# Patient Record
Sex: MALE | Race: WHITE | URBAN - METROPOLITAN AREA
[De-identification: names, ages, dates, MRNs, and addresses within clinical notes are randomized per-mention and may not be internally consistent; named-entity substitution may affect disease eponyms.]

---

## 2018-07-19 ENCOUNTER — TRANSFERRED RECORDS (OUTPATIENT)
Dept: HEALTH INFORMATION MANAGEMENT | Facility: CLINIC | Age: 68
End: 2018-07-19

## 2018-09-04 ENCOUNTER — MEDICAL CORRESPONDENCE (OUTPATIENT)
Dept: HEALTH INFORMATION MANAGEMENT | Facility: CLINIC | Age: 68
End: 2018-09-04

## 2018-09-04 ENCOUNTER — TRANSFERRED RECORDS (OUTPATIENT)
Dept: HEALTH INFORMATION MANAGEMENT | Facility: CLINIC | Age: 68
End: 2018-09-04

## 2018-09-07 ENCOUNTER — TELEPHONE (OUTPATIENT)
Dept: SURGERY | Facility: CLINIC | Age: 68
End: 2018-09-07

## 2018-09-07 NOTE — TELEPHONE ENCOUNTER
Patient is being referred by Dr. Kenyon Zhu for rectal prolapse.  Patient is scheduled 9/17/18 at 2:00 pm.  Spoke with Naomi, care coordinator for Dr. Zhu.  Naomi will contact patient with appointment details, and push images to [place] PACS system.  Naomi has my direct number for additional questions or concerns.

## 2018-09-17 ENCOUNTER — OFFICE VISIT (OUTPATIENT)
Dept: SURGERY | Facility: CLINIC | Age: 68
End: 2018-09-17
Payer: COMMERCIAL

## 2018-09-17 VITALS
HEART RATE: 82 BPM | HEIGHT: 70 IN | OXYGEN SATURATION: 96 % | DIASTOLIC BLOOD PRESSURE: 60 MMHG | TEMPERATURE: 98.4 F | BODY MASS INDEX: 22.45 KG/M2 | SYSTOLIC BLOOD PRESSURE: 101 MMHG | WEIGHT: 156.8 LBS

## 2018-09-17 DIAGNOSIS — K62.89 ANAL PAIN: ICD-10-CM

## 2018-09-17 DIAGNOSIS — K62.6 RECTAL ULCER: ICD-10-CM

## 2018-09-17 DIAGNOSIS — K62.3 RECTAL PROLAPSE: Primary | ICD-10-CM

## 2018-09-17 LAB
ABO + RH BLD: NORMAL
ABO + RH BLD: NORMAL
ALBUMIN SERPL-MCNC: 3.2 G/DL (ref 3.4–5)
ALP SERPL-CCNC: 160 U/L (ref 40–150)
ALT SERPL W P-5'-P-CCNC: 63 U/L (ref 0–70)
ANION GAP SERPL CALCULATED.3IONS-SCNC: 6 MMOL/L (ref 3–14)
AST SERPL W P-5'-P-CCNC: 76 U/L (ref 0–45)
BILIRUB SERPL-MCNC: 0.7 MG/DL (ref 0.2–1.3)
BLD GP AB SCN SERPL QL: NORMAL
BLOOD BANK CMNT PATIENT-IMP: NORMAL
BUN SERPL-MCNC: 14 MG/DL (ref 7–30)
CALCIUM SERPL-MCNC: 9 MG/DL (ref 8.5–10.1)
CEA SERPL-MCNC: 0.8 UG/L (ref 0–2.5)
CHLORIDE SERPL-SCNC: 104 MMOL/L (ref 94–109)
CO2 SERPL-SCNC: 27 MMOL/L (ref 20–32)
CREAT SERPL-MCNC: 0.98 MG/DL (ref 0.66–1.25)
ERYTHROCYTE [DISTWIDTH] IN BLOOD BY AUTOMATED COUNT: 14 % (ref 10–15)
GFR SERPL CREATININE-BSD FRML MDRD: 76 ML/MIN/1.7M2
GLUCOSE SERPL-MCNC: 92 MG/DL (ref 70–99)
HCT VFR BLD AUTO: 46.3 % (ref 40–53)
HGB BLD-MCNC: 14.9 G/DL (ref 13.3–17.7)
MCH RBC QN AUTO: 29.4 PG (ref 26.5–33)
MCHC RBC AUTO-ENTMCNC: 32.2 G/DL (ref 31.5–36.5)
MCV RBC AUTO: 91 FL (ref 78–100)
PLATELET # BLD AUTO: 194 10E9/L (ref 150–450)
POTASSIUM SERPL-SCNC: 3.7 MMOL/L (ref 3.4–5.3)
PREALB SERPL IA-MCNC: 20 MG/DL (ref 15–45)
PROT SERPL-MCNC: 8.3 G/DL (ref 6.8–8.8)
RBC # BLD AUTO: 5.07 10E12/L (ref 4.4–5.9)
SODIUM SERPL-SCNC: 138 MMOL/L (ref 133–144)
SPECIMEN EXP DATE BLD: NORMAL
WBC # BLD AUTO: 6.7 10E9/L (ref 4–11)

## 2018-09-17 RX ORDER — METOPROLOL TARTRATE 25 MG/1
25 TABLET, FILM COATED ORAL EVERY MORNING
COMMUNITY

## 2018-09-17 RX ORDER — ATORVASTATIN CALCIUM 80 MG/1
40 TABLET, FILM COATED ORAL EVERY MORNING
COMMUNITY
Start: 2018-07-03

## 2018-09-17 RX ORDER — ALBUTEROL SULFATE 90 UG/1
1 AEROSOL, METERED RESPIRATORY (INHALATION) EVERY 4 HOURS PRN
COMMUNITY
Start: 2016-07-25

## 2018-09-17 RX ORDER — TAMSULOSIN HYDROCHLORIDE 0.4 MG/1
0.4 CAPSULE ORAL EVERY MORNING
COMMUNITY
Start: 2016-05-04

## 2018-09-17 RX ORDER — FLUCONAZOLE 100 MG/1
100 TABLET ORAL DAILY
COMMUNITY
Start: 2018-09-07

## 2018-09-17 ASSESSMENT — ENCOUNTER SYMPTOMS
SHORTNESS OF BREATH: 1
DYSPNEA ON EXERTION: 1

## 2018-09-17 ASSESSMENT — PAIN SCALES - GENERAL: PAINLEVEL: MILD PAIN (3)

## 2018-09-17 NOTE — PROGRESS NOTES
"Colon and Rectal Surgery Clinic Note    RE: Haseeb Neal.  : 1950.  MIN: 2018.    Reason for visit: Rectal ulcer and prolapse.    HPI: 68 y/o M who recently presented with Dr. Zhu for \"hemorrhoids\" with a 6-month h/o progressive anal pain associated with intermittent seepage of mucous. Denies hematochezia, fecal urgency or incontinence, unintentional weight loss, or urinary symptoms. Currently having 1-2 soft BMs per day. Denies excessive straining or sensation of incomplete evacuation. PM/SH significant for CAD, MI, HTN, COPD, dyslipidemia, AAA (s/p aortobifemoral bypass with re-implantation of JORGE in 2016), and chronic tobacco abuse (1 PPD) quit in 2016. Drinks 2-4 beers per day. Denies FH of CRC or polyps. Last colonoscopy was in  and 3 small tubular adenomas were removed. No previous anorectal operations.      Medical history:  No past medical history on file.    Surgical history:  No past surgical history on file.    Family history:  No family history on file.    Medications:  Current Outpatient Prescriptions   Medication Sig Dispense Refill     albuterol (VENTOLIN HFA) 108 (90 Base) MCG/ACT inhaler Inhale 1 puff into the lungs       tamsulosin (FLOMAX) 0.4 MG capsule Take 0.4 mg by mouth       aspirin 81 MG tablet Take 81 mg by mouth       atorvastatin (LIPITOR) 80 MG tablet        fluconazole (DIFLUCAN) 100 MG tablet        fluticasone-vilanterol (BREO ELLIPTA) 100-25 MCG/INH inhaler        INCRUSE ELLIPTA 62.5 MCG/INH Inhaler        metoprolol tartrate (LOPRESSOR) 25 MG tablet Take 25 mg by mouth         Allergies:  Allergies   Allergen Reactions     Penicillins Hives       Social history:   Social History   Substance Use Topics     Smoking status: Former Smoker     Smokeless tobacco: Never Used     Alcohol use Yes     Marital status: .    Physical Examination:  /60  Pulse 82  Temp 98.4  F (36.9  C) (Oral)  Ht 5' 10\"  Wt 156 lb 12.8 oz  SpO2 96%  BMI 22.5 " kg/m2  General: Well hydrated. No acute distress.  Abdomen: Soft, NT. No inguinal adenopathy palpated.  Perianal external examination:  Perianal skin: circumferential lichenification and superficial fissuring. Moist anus.  Lesions: Yes: 2x2-cm area of induration at the right anterior aspect of the anal verge. No discrete mass per se. Tender upon superficial palpation. No fluctuance or purulence.  Eversion of buttocks: There was not evidence of an anal fissure. Details: N/A.  Skin tags or external hemorrhoids: Yes: small, non-thrombosed.  Digital rectal examination: Could not be completed to do patient discomfort.    Investigations:  None.    Procedures:  None.    ASSESSMENT  68 y/o M with significant PMH and progressive anal pain. Limited exam findings suspicious for anorectal infection vs. neoplasm. Risks, benefits, and alternatives of operative treatment were thoroughly discussed with the patient, he/she understands these well and agrees to proceed.    PLAN  1. Schedule Labs and 3T MR pelvis today.  2. Schedule EUA, Flex Sig, possible biopsies. Will need PAC and 2 Fleets.    Time spent: 60 minutes.  >50% spent in discussing, counseling and coordinating care.    Radu Asher M.D., M.Sc.     Division of Colon and Rectal Surgery  [facility]    Referring Provider:  Kenyon Zhu DO  [facility]  [address] [place] [address]     Primary Care Provider:  Jimy Hill

## 2018-09-17 NOTE — LETTER
"2018       RE: Haseeb Neal  [address]  [facility] [address]     Dear Colleague,    Thank you for referring your patient, Haseeb Neal, to the [facility] COLON AND RECTAL SURGERY at [facility]. Please see a copy of my visit note below.    Colon and Rectal Surgery Clinic Note    RE: Haseeb Neal.  : 1950.  MIN: 2018.    Reason for visit: Rectal ulcer and prolapse.    HPI: 66 y/o M who recently presented with Dr. Zhu for \"hemorrhoids\" with a 6-month h/o progressive anal pain associated with intermittent seepage of mucous. Denies hematochezia, fecal urgency or incontinence, unintentional weight loss, or urinary symptoms. Currently having 1-2 soft BMs per day. Denies excessive straining or sensation of incomplete evacuation. PM/SH significant for CAD, MI, HTN, COPD, dyslipidemia, AAA (s/p aortobifemoral bypass with re-implantation of JORGE in ), and chronic tobacco abuse (1 PPD) quit in 2016. Drinks 2-4 beers per day. Denies FH of CRC or polyps. Last colonoscopy was in  and 3 small tubular adenomas were removed. No previous anorectal operations.      Medical history:  No past medical history on file.    Surgical history:  No past surgical history on file.    Family history:  No family history on file.    Medications:  Current Outpatient Prescriptions   Medication Sig Dispense Refill     albuterol (VENTOLIN HFA) 108 (90 Base) MCG/ACT inhaler Inhale 1 puff into the lungs       tamsulosin (FLOMAX) 0.4 MG capsule Take 0.4 mg by mouth       aspirin 81 MG tablet Take 81 mg by mouth       atorvastatin (LIPITOR) 80 MG tablet        fluconazole (DIFLUCAN) 100 MG tablet        fluticasone-vilanterol (BREO ELLIPTA) 100-25 MCG/INH inhaler        INCRUSE ELLIPTA 62.5 MCG/INH Inhaler        metoprolol tartrate (LOPRESSOR) 25 MG tablet Take 25 mg by mouth         Allergies:  Allergies   Allergen Reactions     Penicillins Hives       Social history: " "  Social History   Substance Use Topics     Smoking status: Former Smoker     Smokeless tobacco: Never Used     Alcohol use Yes     Marital status: .    Physical Examination:  /60  Pulse 82  Temp 98.4  F (36.9  C) (Oral)  Ht 5' 10\"  Wt 156 lb 12.8 oz  SpO2 96%  BMI 22.5 kg/m2  General: Well hydrated. No acute distress.  Abdomen: Soft, NT. No inguinal adenopathy palpated.  Perianal external examination:  Perianal skin: circumferential lichenification and superficial fissuring. Moist anus.  Lesions: Yes: 2x2-cm area of induration at the right anterior aspect of the anal verge. No discrete mass per se. Tender upon superficial palpation. No fluctuance or purulence.  Eversion of buttocks: There was not evidence of an anal fissure. Details: N/A.  Skin tags or external hemorrhoids: Yes: small, non-thrombosed.  Digital rectal examination: Could not be completed to do patient discomfort.    Investigations:  None.    Procedures:  None.    ASSESSMENT  66 y/o M with significant PMH and progressive anal pain. Limited exam findings suspicious for anorectal infection vs. neoplasm. Risks, benefits, and alternatives of operative treatment were thoroughly discussed with the patient, he/she understands these well and agrees to proceed.    PLAN  1. Schedule Labs and 3T MR pelvis today.  2. Schedule EUA, Flex Sig, possible biopsies. Will need PAC and 2 Fleets.    Time spent: 60 minutes.  >50% spent in discussing, counseling and coordinating care.    Radu Asher M.D., M.Sc.     Division of Colon and Rectal Surgery  [facility]    Referring Provider:  Kenyon Zhu [facility]  [address] [place] [address]   [place] [address]     Primary Care Provider:  Jimy Hill      Again, thank you for allowing me to participate in the care of your patient.      Sincerely,    Rdau Asher MD      "

## 2018-09-17 NOTE — NURSING NOTE
"Chief Complaint   Patient presents with     Clinic Care Coordination - Follow-up     New patient consultation, rectal prolapse.        Vitals:    09/17/18 1429   BP: 101/60   Pulse: 82   Temp: 98.4  F (36.9  C)   TempSrc: Oral   SpO2: 96%   Weight: 156 lb 12.8 oz   Height: 5' 10\"       Body mass index is 22.5 kg/(m^2).            Abner LEE LPN               "

## 2018-09-17 NOTE — MR AVS SNAPSHOT
After Visit Summary   2018    Haseeb Neal    MRN: [id number]           Patient Information     Date Of Birth          1950        Visit Information        Provider Department      2018 2:00 PM Radu Asher MD [facility] Colon and Rectal Surgery        Today's Diagnoses     Rectal prolapse    -  1    Rectal ulcer        Anal pain           Follow-ups after your visit        Additional Services     PAC Visit Referral (For [facility] Only)                 Future tests that were ordered for you today     Open Future Orders        Priority Expected Expires Ordered    MR Pelvis Musclular Tissue wo & w Contrast Routine  2019    CBC with platelets Routine  12/15/2018 2018    Comprehensive metabolic panel Routine  12/15/2018 2018    CEA Routine  12/15/2018 2018    Prealbumin Routine  12/15/2018 2018            Who to contact     Please call your clinic at [phone] to:    Ask questions about your health    Make or cancel appointments    Discuss your medicines    Learn about your test results    Speak to your doctor            Additional Information About Your Visit        MyChart Information     Holvi is an electronic gateway that provides easy, online access to your medical records. With Holvi, you can request a clinic appointment, read your test results, renew a prescription or communicate with your care team.     To sign up for Holvi visit the website at [email or web address]   You will be asked to enter the access code listed below, as well as some personal information. Please follow the directions to create your username and password.     Your access code is: [email or web address]  Expires: 2018  6:30 AM     Your access code will  in 90 days. If you need help or a new code, please contact your [facility] or call [phone] for assistance.        Care EveryWhere ID     This is your Care  "EveryWhere ID. This could be used by other organizations to access your [place] medical records  [id number]        Your Vitals Were     Pulse Temperature Height Pulse Oximetry BMI (Body Mass Index)       82 98.4  F (36.9  C) (Oral) 5' 10\" 96% 22.5 kg/m2        Blood Pressure from Last 3 Encounters:   09/17/18 101/60    Weight from Last 3 Encounters:   09/17/18 156 lb 12.8 oz              We Performed the Following     ABO/Rh type and screen     PAC Visit Referral (For [facility] Only)     Hattie-Operative Worksheet        Primary Care Provider Office Phone # Fax #    Jimy Hill [phone] [address] [facility] [address]        Equal Access to Services     MAKENZIE GAMA : Hadii javier rosado hadasho Soomaali, waaxda luqadaha, qaybta kaalmada adeegyada, waxdilcia chahalin hayjennifer holland . So [facility] [phone].    ATENCIÓN: Si habla español, tiene a rogers disposición servicios gratuitos de asistencia lingüística. [facility] [phone].    We comply with applicable federal civil rights laws and [place] laws. We do not discriminate on the basis of race, color, national origin, age, disability, sex, sexual orientation, or gender identity.            Thank you!     Thank you for choosing [facility] COLON AND RECTAL SURGERY  for your care. Our goal is always to provide you with excellent care. Hearing back from our patients is one way we can continue to improve our services. Please take a few minutes to complete the written survey that you may receive in the mail after your visit with us. Thank you!             Your Updated Medication List - Protect others around you: Learn how to safely use, store and throw away your medicines at www.disposemymeds.org.          This list is accurate as of 9/17/18  3:08 PM.  Always use your most recent med list.                   Brand Name Dispense Instructions for use Diagnosis    aspirin 81 MG tablet      Take 81 mg by mouth        atorvastatin 80 MG " tablet    LIPITOR          fluconazole 100 MG tablet    DIFLUCAN          fluticasone-vilanterol 100-25 MCG/INH inhaler    BREO ELLIPTA          INCRUSE ELLIPTA 62.5 MCG/INH inhaler   Generic drug:  umeclidinium           metoprolol tartrate 25 MG tablet    LOPRESSOR     Take 25 mg by mouth        tamsulosin 0.4 MG capsule    FLOMAX     Take 0.4 mg by mouth        VENTOLIN  (90 Base) MCG/ACT inhaler   Generic drug:  albuterol      Inhale 1 puff into the lungs

## 2018-09-28 ENCOUNTER — OFFICE VISIT (OUTPATIENT)
Dept: SURGERY | Facility: CLINIC | Age: 68
End: 2018-09-28
Payer: COMMERCIAL

## 2018-09-28 ENCOUNTER — ANESTHESIA EVENT (OUTPATIENT)
Dept: SURGERY | Facility: CLINIC | Age: 68
End: 2018-09-28
Payer: MEDICARE

## 2018-09-28 ENCOUNTER — RADIANT APPOINTMENT (OUTPATIENT)
Dept: MRI IMAGING | Facility: CLINIC | Age: 68
End: 2018-09-28
Attending: COLON & RECTAL SURGERY
Payer: COMMERCIAL

## 2018-09-28 VITALS
HEART RATE: 72 BPM | OXYGEN SATURATION: 98 % | BODY MASS INDEX: 22.58 KG/M2 | SYSTOLIC BLOOD PRESSURE: 112 MMHG | DIASTOLIC BLOOD PRESSURE: 74 MMHG | WEIGHT: 157.74 LBS | TEMPERATURE: 98.5 F | HEIGHT: 70 IN

## 2018-09-28 DIAGNOSIS — K62.89 ANAL PAIN: ICD-10-CM

## 2018-09-28 DIAGNOSIS — K62.3 RECTAL PROLAPSE: ICD-10-CM

## 2018-09-28 DIAGNOSIS — K62.6 RECTAL ULCER: ICD-10-CM

## 2018-09-28 DIAGNOSIS — Z01.818 PREOP EXAMINATION: Primary | ICD-10-CM

## 2018-09-28 RX ORDER — GADOBUTROL 604.72 MG/ML
7.5 INJECTION INTRAVENOUS ONCE
Status: COMPLETED | OUTPATIENT
Start: 2018-09-28 | End: 2018-09-28

## 2018-09-28 RX ADMIN — GADOBUTROL 7.5 ML: 604.72 INJECTION INTRAVENOUS at 17:31

## 2018-09-28 ASSESSMENT — LIFESTYLE VARIABLES: TOBACCO_USE: 1

## 2018-09-28 ASSESSMENT — COPD QUESTIONNAIRES: COPD: 1

## 2018-09-28 NOTE — MR AVS SNAPSHOT
After Visit Summary   2018    Haseeb Neal    MRN: [id number]           Patient Information     Date Of Birth          1950        Visit Information        Provider Department      2018 4:00 PM Milena Hopkins APRN CNP [facility]        Care Instructions    Preparing for Your Surgery      Name:  Haseeb Neal   MRN:  [id number]   :  1950   Today's Date:  2018     Arriving for surgery:  Surgery date:  10/9/18  Arrival time:  1:40PM  Please come to:       [facility] [address]  [address] [place]  [address]    -   parking is available in front of the hospital from 5:15 am to 8:00 pm    -  Stop at the Information Desk in the lobby    -   Inform the information person that you are here for surgery. An escort to 3c will be provided. If you would not like an escort, please proceed to 3C on the 3rd floor. [phone]     What can I eat or drink?  -  You may have solid food or milk products until Midnight prior to your surgery.  -  You may have water and/or apple juice  until 2 hours prior to your surgery.    Which medicines can I take?  Stop Aspirin one week prior to surgery.    -  Please take these medications the day of surgery:    Take all morning medications and use inhalers the morning of procedure.     2 fleets enemas the morning of procedure, start 2 hours prior to arrival.     How do I prepare myself?  -  Take two showers: one the night before surgery; and one the morning of surgery.         Use Scrubcare or Hibiclens to wash from neck down.  You may use your own     shampoo and conditioner. No other hair products.   -  Do NOT use lotion, powder, deodorant, or antiperspirant the day of your surgery.  -  Do NOT wear any jewelry.  - Do not bring your own medications to the hospital, except for inhalers and eye   drops.  -  Bring your ID and insurance card.    Questions or  Concerns:  -If you have questions or concerns regarding the day of surgery, please call [phone].     -For questions after surgery please call your surgeons office.                     Follow-ups after your visit        Your next 10 appointments already scheduled     Sep 28, 2018  5:30 PM CDT   MR PELVIS MUSCULAR TISSUE WO & W CONTRAST with [id number]   [facility] MRI ([facility])    [address] [address] [phone]           How do I prepare for my exam? (Food and drink instructions) **If you will be receiving sedation or general anesthesia, please see special notes below.**  How do I prepare for my exam? (Other instructions) Take your medicines as usual, unless your doctor tells you not to. You may or may not receive intravenous (IV) contrast for this exam pending the discretion of the Radiologist.  You do not need to do anything special to prepare.  **If you will be receiving sedation or general anesthesia, please see special notes below.**  What should I wear: The MRI machine uses a strong magnet. Please wear clothes without metal (snaps, zippers). A sweatsuit works well, or we may give you a hospital gown. Please remove any body piercings and hair extensions before you arrive. You will also remove watches, jewelry, hairpins, wallets, dentures, partial dental plates and hearing aids. You may wear contact lenses, and you may be able to wear your rings. We have a safe place to keep your personal items, but it is safer to leave them at home.  How long does the exam take: Most tests take 30 to 60 minutes.  HOWEVER, IF YOUR DOCTOR PRESCRIBES ANESTHESIA please plan on spending four to five hours in the recovery room.  What should I bring:  Bring a list of your current medicines to your exam (including vitamins, minerals and over-the-counter drugs).  Do I need a :  **If you will be receiving sedation or general anesthesia, please see  special notes below.**  What should I do after the exam: No Restrictions, You may resume normal activities.  What is this test: MRI (magnetic resonance imaging) uses a strong magnet and radio waves to look inside the body. An MRA (magnetic resonance angiogram) does the same thing, but it lets us look at your blood vessels. A computer turns the radio waves into pictures showing cross sections of the body, much like slices of bread. This helps us see any problems more clearly. You may receive fluid (called  contrast ) before or during your scan. The fluid helps us see the pictures better. We give the fluid through an IV (small needle in your arm).  Who should I call with questions:  Please call the Imaging Department at your exam site with any questions. Directions, parking instructions, and other information is available on our website, [email or web address].  How do I prepare if I m having sedation or anesthesia? **IMPORTANT** THE INSTRUCTIONS BELOW ARE ONLY FOR THOSE PATIENTS WHO HAVE BEEN TOLD THEY WILL RECEIVE SEDATION OR GENERAL ANESTHESIA DURING THEIR MRI PROCEDURE:  IF YOU WILL RECEIVE SEDATION (take medicine to help you relax during your exam): You must get the medicine from your doctor before you arrive. Bring the medicine to the exam. Do not take it at home. Arrive one hour early. Bring someone who can take you home after the test. Your medicine will make you sleepy. After the exam, you may not drive, take a bus or take a taxi by yourself. No eating 8 hours before your exam. You may have clear liquids up until 4 hours before your exam. (Clear liquids include water, clear tea, black coffee and fruit juice without pulp.)  IF YOU WILL RECEIVE ANESTHESIA (be asleep for your exam): Arrive 1 1/2 hours early. Bring someone who can take you home after the test. You may not drive, take a bus or take a taxi by yourself. No eating 8 hours before your exam. You may have clear liquids up until 4 hours before your exam.  "(Clear liquids include water, clear tea, black coffee and fruit juice without pulp.)            Oct 09, 2018   Procedure with Radu Asher MD   [facility], [place], Same Day Surgery (--)    [address]  [place] [id number] [phone]              Who to contact     Please call your clinic at [phone] to:    Ask questions about your health    Make or cancel appointments    Discuss your medicines    Learn about your test results    Speak to your doctor            Additional Information About Your Visit        [employer] Information     Ruz[employer] is an electronic gateway that provides easy, online access to your medical records. With [employer]uz[employer], you can request a clinic appointment, read your test results, renew a prescription or communicate with your care team.     To sign up for Ruzuku visit the website at [email or web address]   You will be asked to enter the access code listed below, as well as some personal information. Please follow the directions to create your username and password.     Your access code is: [email or web address]  Expires: 2018  6:30 AM     Your access code will  in 90 days. If you need help or a new code, please contact your [facility] or call [phone] for assistance.        Care EveryWhere ID     This is your Care EveryWhere ID. This could be used by other organizations to access your [place] medical records  [id number]        Your Vitals Were     Pulse Temperature Height Pulse Oximetry BMI (Body Mass Index)       72 98.5  F (36.9  C) (Oral) 1.778 m (5' 10\") 98% 22.63 kg/m2        Blood Pressure from Last 3 Encounters:   18 112/74   18 101/60    Weight from Last 3 Encounters:   18 71.6 kg (157 lb 11.8 oz)   18 71.1 kg (156 lb 12.8 oz)              Today, you had the following     No orders found for display       Primary Care Provider Office Phone # Fax #    Jimy Hill [phone]       " [address]  [facility] [address]        Equal Access to Services     ROXANNAMATILDA LANETTE : Hadii aad ku hadmalao Soenedinaali, waaxda luqadaha, qaybta kaalmada sonalmaribellori, edison munguiamelaniaelton nicholas. So [facility] [phone].    ATENCIÓN: Si habla español, tiene a rogers disposición servicios gratuitos de asistencia lingüística. Llame al [phone].    We comply with applicable federal civil rights laws and [place] laws. We do not discriminate on the basis of race, color, national origin, age, disability, sex, sexual orientation, or gender identity.            Thank you!     Thank you for choosing [facility]  for your care. Our goal is always to provide you with excellent care. Hearing back from our patients is one way we can continue to improve our services. Please take a few minutes to complete the written survey that you may receive in the mail after your visit with us. Thank you!             Your Updated Medication List - Protect others around you: Learn how to safely use, store and throw away your medicines at www.disposemymeds.org.          This list is accurate as of 9/28/18  4:23 PM.  Always use your most recent med list.                   Brand Name Dispense Instructions for use Diagnosis    aspirin 81 MG tablet      Take 81 mg by mouth every morning        atorvastatin 80 MG tablet    LIPITOR     Take 80 mg by mouth every morning        fluconazole 100 MG tablet    DIFLUCAN     Take 100 mg by mouth daily        fluticasone-vilanterol 100-25 MCG/INH inhaler    BREO ELLIPTA          INCRUSE ELLIPTA 62.5 MCG/INH inhaler   Generic drug:  umeclidinium           metoprolol tartrate 25 MG tablet    LOPRESSOR     Take 25 mg by mouth every morning        tamsulosin 0.4 MG capsule    FLOMAX     Take 0.4 mg by mouth every morning        VENTOLIN  (90 Base) MCG/ACT inhaler   Generic drug:  albuterol      Inhale 1 puff into the lungs

## 2018-09-28 NOTE — PATIENT INSTRUCTIONS
Preparing for Your Surgery      Name:  Haseeb Neal   MRN:  [id number]   :  1950   Today's Date:  2018     Arriving for surgery:  Surgery date:  10/9/18  Arrival time:  1:40PM  Please come to:       [facility] [address]  [address] [place]  [address]    -   parking is available in front of the hospital from 5:15 am to 8:00 pm    -  Stop at the Information Desk in the lobby    -   Inform the information person that you are here for surgery. An escort to 3c will be provided. If you would not like an escort, please proceed to 3C on the 3rd floor. [phone]     What can I eat or drink?  -  You may have solid food or milk products until Midnight prior to your surgery.  -  You may have water and/or apple juice  until 2 hours prior to your surgery.    Which medicines can I take?  Stop Aspirin one week prior to surgery.    -  Please take these medications the day of surgery:    Take all morning medications and use inhalers the morning of procedure.     2 fleets enemas the morning of procedure, start 2 hours prior to arrival.     How do I prepare myself?  -  Take two showers: one the night before surgery; and one the morning of surgery.         Use Scrubcare or Hibiclens to wash from neck down.  You may use your own     shampoo and conditioner. No other hair products.   -  Do NOT use lotion, powder, deodorant, or antiperspirant the day of your surgery.  -  Do NOT wear any jewelry.  - Do not bring your own medications to the hospital, except for inhalers and eye   drops.  -  Bring your ID and insurance card.    Questions or Concerns:  -If you have questions or concerns regarding the day of surgery, please call [phone].     -For questions after surgery please call your surgeons office.

## 2018-09-28 NOTE — ANESTHESIA PREPROCEDURE EVALUATION
Anesthesia Evaluation     . Pt has had prior anesthetic. Type: General    No history of anesthetic complications          ROS/MED HX    ENT/Pulmonary:     (+)tobacco use, Past use 1.5 packs/day  COPD, , . .    Neurologic:  - neg neurologic ROS     Cardiovascular:     (+) hypertension-Peripheral Vascular Disease-- Symptomatic, CAD, -past MI,-stent,2006  1 . Taking blood thinners : . . . :. . Previous cardiac testing Echodate:2016results:   Result Narrative    1. Normal left ventricular size and systolic performance with a visually   estimated ejection fraction of 55-60%.   2. No significant valvular heart disease is identified on this study.   3. Probable normal right ventricular size and systolic performance though   right-sided structures are not clearly visualized on all views on this   study.   4. There is mild left atrial enlargement    date: results:ECG reviewed date:2016 results:Normal sinus rhythm  Left axis deviation  Abnormal ECG  When compared with ECG of 09-JUL-2010 10:15,  Nonspecific T wave abnormality now evident in Anterior leads  Confirmed by GUILLERMO ALLEN MD LOC:[facility] ([id number]) on 6/3/2018 4:26:05 PM date: results:          METS/Exercise Tolerance:  >4 METS   Hematologic:     (+) History of Transfusion no previous transfusion reaction -      Musculoskeletal:   (+) , , other musculoskeletal- follows with chiropractor for low back pain      GI/Hepatic:     (+) bowel prep,       Renal/Genitourinary:     (+) BPH,       Endo:  - neg endo ROS       Psychiatric:  - neg psychiatric ROS       Infectious Disease:  - neg infectious disease ROS       Malignancy:      - no malignancy   Other:    (+) no H/O Chronic Pain,                   Physical Exam  Normal systems: pulmonary    Airway   Mallampati: III  TM distance: >3 FB  Neck ROM: full    Dental   (+) missing  Comment: Multiple missing teeth - doesn't wear his partial.      Cardiovascular   Rhythm and rate: regular and normal  (+) weak pulses       Pulmonary     breath sounds clear to auscultation               PAC Discussion and Assessment    ASA Classification: 3  Case is suitable for: [facility]  Anesthetic techniques and relevant risks discussed: MAC with GA as backup  Invasive monitoring and risk discussed:   Types:   Possibility and Risk of blood transfusion discussed:   NPO instructions given:   Additional anesthetic preparation and risks discussed:   Needs early admission to pre-op area:   Other:     PAC Resident/NP Anesthesia Assessment:  Haseeb Neal is a 67 year old male scheduled for a Evaluation Under Anesthesia, Flexible Sigmoidoscopy, Possible Biopsies on 10/9/2018 by Dr. Asher in evaluation of anal pain/possible rectal prolapse.  PAC referral for risk assessment and optimization for anesthesia with comorbid conditions of: hypertension, CAD, old MI, PAD, hyperlipidemia, COPD, history of smoking and BPH.      Pre-operative considerations:  1.  Cardiac:  Functional status- METS >4.  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  He had a MI in 2006 and had one stent placed.  He had a stress test in 2016 that showed an EF of 65% and no evidence of ischemia.  He has symptomatic BLE PAD - encouraged him to follow up with his vascular surgeon as he feels his RLE has become more symptomatic lately.  S/P AAA grafting in 2016.   Low risk surgery with 0.9% risk of major adverse cardiac event.   2.  Pulm:  Airway feasible.  HEIDI risk: intermediate.  He quit smoking in 2016.   COPD is well controlled with breo and ellipta.    3.  GI:  Risk of PONV score = 1.  If > 2, anti-emetic intervention recommended.  4. :  On flomax for BPH.  He denies any problems with urinary retention.  5. Psych: Drinks 3-4 beers per day regularly.  Denies any symptoms of withdrawal when abstaining.  Encouraged to avoid alcohol 24 hours prior to anesthesia.  6.  Musculoskeletal:  + low back pain - consider cautious positioning.     VTE risk: 1.8%    Patient is optimized and is acceptable candidate for the proposed procedure.  No further diagnostic evaluation is needed.     Patient discussed with Dr. Monroy.     **For further details of assessment, testing, and physical exam please see H and P completed on same date.          Milena Hopkins DNP, RN, APRN      Reviewed and Signed by PAC Mid-Level Provider/Resident  Mid-Level Provider/Resident: Milena Hopkins DNP, RN, APRN  Date: 9/28/2018  Time: 1658    Attending Anesthesiologist Anesthesia Assessment:        Anesthesiologist:   Date:   Time:   Pass/Fail:   Disposition:     PAC Pharmacist Assessment:        Pharmacist:   Date:   Time:      Anesthesia Plan      History & Physical Review  History and physical reviewed and following examination; no interval change.    ASA Status:  3 .    NPO Status:  > 8 hours    Plan for MAC with Intravenous induction. Maintenance will be TIVA.  Reason for MAC:  Deep or markedly invasive procedure (G8)  PONV prophylaxis:  Ondansetron (or other 5HT-3)       Postoperative Care      Consents  Anesthetic plan, risks, benefits and alternatives discussed with:  Patient.  Use of blood products discussed: No .   .                          .

## 2018-09-29 NOTE — H&P
Pre-Operative H & P     CC:  Preoperative exam to assess for increased cardiopulmonary risk while undergoing surgery and anesthesia.    Date of Encounter: 9/28/2018  Primary Care Physician:  Jimy Hill  Associated diagnosis: anal pain, rectal prolapse    HPI  Haseeb Neal is a 67 year old male who presents for pre-operative H & P in preparation for a Evaluation Under Anesthesia, Flexible Sigmoidoscopy, Possible Biopsies with Dr. Asher on 10/9/2018 at [facility].     Haseeb Neal is a 67 year old male with hypertension, CAD, old MI, PAD, hyperlipidemia, COPD, history of smoking and BPH that has a rectal prolapse.  He had been having anal pain and anal mucous for about 6 months, so he was referred to Dr. Asher for evaluation.  Dr. Asher's physical exam findings noted possible anorectal infection vs neoplasm.  He recommended further evaluation with the above listed procedure.      History is obtained from the patient.     Past Medical History  Past Medical History:   Diagnosis Date     BPH (benign prostatic hyperplasia)      CAD (coronary artery disease)      COPD (chronic obstructive pulmonary disease) (H)      History of smoking      HTN (hypertension)      Hyperlipidemia      Old MI (myocardial infarction) 2006     PAD (peripheral artery disease) (H)      Presence of stent in coronary artery 2006       Past Surgical History  Past Surgical History:   Procedure Laterality Date     AAA REPAIR  04/2016    graft repair     APPENDECTOMY       GR II CORONARY STENT  2006     HERNIA REPAIR      x2       Hx of Blood transfusions/reactions: yes, no reactions     Hx of abnormal bleeding or anti-platelet use: aspirin      Steroid use in the last year: short course of prednisone several months ago for a COPD exacerbation.  No long term steroids.      Personal or FH with difficulty with Anesthesia:  none    Prior to Admission Medications  Current Outpatient Prescriptions    Medication Sig Dispense Refill     albuterol (VENTOLIN HFA) 108 (90 Base) MCG/ACT inhaler Inhale 1 puff into the lungs every 4 hours as needed        aspirin 81 MG tablet Take 81 mg by mouth every morning        atorvastatin (LIPITOR) 80 MG tablet Take 80 mg by mouth every morning        fluconazole (DIFLUCAN) 100 MG tablet Take 100 mg by mouth daily        fluticasone-vilanterol (BREO ELLIPTA) 100-25 MCG/INH inhaler Inhale 1 puff into the lungs every evening        INCRUSE ELLIPTA 62.5 MCG/INH Inhaler Inhale 1 puff into the lungs daily        metoprolol tartrate (LOPRESSOR) 25 MG tablet Take 25 mg by mouth every morning        tamsulosin (FLOMAX) 0.4 MG capsule Take 0.4 mg by mouth every morning          Allergies  Allergies   Allergen Reactions     Penicillins Hives       Social History  Social History     Social History     Marital status:      Spouse name: N/A     Number of children: 4     Years of education: N/A     Occupational History     administrative at [employer]      Social History Main Topics     Smoking status: Former Smoker     Packs/day: 1.50     Types: Cigarettes     Quit date: 04/2016     Smokeless tobacco: Never Used     Alcohol use 12.6 - 16.8 oz/week     21 - 28 Cans of beer per week     Drug use: No     Sexual activity: Not on file     Other Topics Concern     Not on file     Social History Narrative       Family History  Family History   Problem Relation Age of Onset     Unknown/Adopted Mother      Emphysema Father      Family History Negative Sister              ROS/MED HX  The complete review of systems is negative other than noted in the HPI or here.  ENT/Pulmonary:     (+)tobacco use, Past use 1.5 packs/day  COPD, , . .    Neurologic:  - neg neurologic ROS     Cardiovascular:     (+) hypertension-Peripheral Vascular Disease-- Symptomatic, CAD, -past MI,-stent,2006 1 . Taking blood thinners : . .   METS/Exercise Tolerance:  >4 METS   Hematologic:     (+) History of Transfusion no  "previous transfusion reaction -      Musculoskeletal:   (+) , , other musculoskeletal- follows with chiropractor for low back pain      GI/Hepatic:  - neg GI/hepatic ROS       Renal/Genitourinary:     (+) BPH,       Endo:  - neg endo ROS       Psychiatric:  - neg psychiatric ROS       Infectious Disease:  - neg infectious disease ROS       Malignancy:      - no malignancy   Other:    (+) no H/O Chronic Pain,               Temp: 98.5  F (36.9  C) Temp src: Oral BP: 112/74 Pulse: 72     SpO2: 98 %         157 lbs 11.84 oz  5' 10\"   Body mass index is 22.63 kg/(m^2).       Physical Exam  Constitutional: Awake, alert, cooperative, no apparent distress, and appears stated age.  Eyes: Pupils equal, round and reactive to light, extra ocular muscles intact, sclera clear, conjunctiva normal.  HENT: Normocephalic, oral pharynx with moist mucus membranes. Dentition - Multiple missing teeth - doesn't wear his partial.  No goiter appreciated.   Respiratory: Clear to auscultation bilaterally, no crackles or wheezing.  Cardiovascular: Regular rate and rhythm, normal S1 and S2, and no murmur noted.  Carotids +2, no bruits. No edema. Palpable radial pulses.  Diminished pedal pulses.  GI: Normal bowel sounds, soft, non-distended, non-tender, no masses palpated, no hepatosplenomegaly.  Surgical scars: midline abdomen  Lymph/Hematologic: No cervical lymphadenopathy and no supraclavicular lymphadenopathy.  Genitourinary:  Deferred.  Skin: Warm and dry.  No rashes at anticipated surgical site.   Musculoskeletal: Full ROM of neck. There is no redness, warmth, or swelling of the expsoed joints. Gross motor strength is normal.    Neurologic: Awake, alert, oriented to name, place and time. Cranial nerves II-XII are grossly intact. Gait is normal.   Neuropsychiatric: Calm, cooperative. Normal affect.     Labs: (personally reviewed)   Component      Latest Ref Rng & Units 9/17/2018   Sodium      133 - 144 mmol/L 138   Potassium      3.4 - 5.3 " mmol/L 3.7   Chloride      94 - 109 mmol/L 104   Carbon Dioxide      20 - 32 mmol/L 27   Anion Gap      3 - 14 mmol/L 6   Glucose      70 - 99 mg/dL 92   Urea Nitrogen      7 - 30 mg/dL 14   Creatinine      0.66 - 1.25 mg/dL 0.98   GFR Estimate      >60 mL/min/1.7m2 76   GFR Estimate If Black      >60 mL/min/1.7m2 >90   Calcium      8.5 - 10.1 mg/dL 9.0   Bilirubin Total      0.2 - 1.3 mg/dL 0.7   Albumin      3.4 - 5.0 g/dL 3.2 (L)   Protein Total      6.8 - 8.8 g/dL 8.3   Alkaline Phosphatase      40 - 150 U/L 160 (H)   ALT      0 - 70 U/L 63   AST      0 - 45 U/L 76 (H)   WBC      4.0 - 11.0 10e9/L 6.7   RBC Count      4.4 - 5.9 10e12/L 5.07   Hemoglobin      13.3 - 17.7 g/dL 14.9   Hematocrit      40.0 - 53.0 % 46.3   MCV      78 - 100 fl 91   MCH      26.5 - 33.0 pg 29.4   MCHC      31.5 - 36.5 g/dL 32.2   RDW      10.0 - 15.0 % 14.0   Platelet Count      150 - 450 10e9/L 194   CEA      0 - 2.5 ug/L 0.8   Prealbumin      15 - 45 mg/dL 20       CT angio  8/2018  IMPRESSION:  1.  Patent aortobifemoral bypass graft.  2.  Occlusion of the origins and proximal segments of the bilateral superficial femoral arteries with reconstitution of the mid and distal segments which demonstrated diffuse calcific atherosclerotic involvement which results in multifocal mild to moderate stenosis.    Myocardial Perfusion  2016  Myocardial Perfusion Conclusions        Myocardial perfusion imaging reveals no scan evidence of         any significant territory of ischemia or infarction.         Left ventricular ejection fraction is 65%.         Left ventricular size and wall motion are normal.         No prior study available.     EKG  2016  Result Narrative   Sinus rhythm  Low voltage QRS  Cannot rule out Anterior infarct (cited on or before 09-APR-2016)  Abnormal ECG  When compared with ECG of 09-APR-2016 12:02,  No significant change was found           ASSESSMENT and PLAN  Haseeb Neal is a 67 year old male scheduled for a  Evaluation Under Anesthesia, Flexible Sigmoidoscopy, Possible Biopsies on 10/9/2018 by Dr. Asher in evaluation of a anal pain/possible rectal prolapse.  PAC referral for risk assessment and optimization for anesthesia with comorbid conditions of: hypertension, CAD, old MI, PAD, hyperlipidemia, COPD, history of smoking and BPH.      Pre-operative considerations:  1.  Cardiac:  Functional status- METS >4.  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  He had a MI in 2006 and had one stent placed.  He had a stress test in 2016 that showed an EF of 65% and no evidence of ischemia.  He has symptomatic BLE PAD - encouraged him to follow up with his vascular surgeon as he feels his RLE has become more symptomatic lately.  S/P AAA grafting in 2016.   Low risk surgery with 0.9% risk of major adverse cardiac event.   2.  Pulm:  Airway feasible.  HEIDI risk: intermediate.  He quit smoking in 2016.   COPD is well controlled with breo and ellipta.    3.  GI:  Risk of PONV score = 1.  If > 2, anti-emetic intervention recommended.  4. :  On flomax for BPH.  He denies any problems with urinary retention.  5. Psych: Drinks 3-4 beers per day regularly.  Denies any symptoms of withdrawal when abstaining.  Encouraged to avoid alcohol 24 hours prior to anesthesia.  6. Musculoskeletal:  + low back pain - consider cautious positioning.     VTE risk: 1.8%    Patient is optimized and is acceptable candidate for the proposed procedure.  No further diagnostic evaluation is needed.     Patient discussed with Dr. Monroy.               Milena Hopkins DNP, RN, APRN  Preoperative Assessment Center  [facility]  Phone: [phone]  Fax: [phone]

## 2018-09-29 NOTE — DISCHARGE INSTRUCTIONS

## 2018-10-09 ENCOUNTER — ANESTHESIA (OUTPATIENT)
Dept: SURGERY | Facility: CLINIC | Age: 68
End: 2018-10-09
Payer: MEDICARE

## 2018-10-09 ENCOUNTER — HOSPITAL ENCOUNTER (OUTPATIENT)
Facility: CLINIC | Age: 68
Discharge: HOME | End: 2018-10-09
Attending: COLON & RECTAL SURGERY | Admitting: COLON & RECTAL SURGERY
Payer: MEDICARE

## 2018-10-09 ENCOUNTER — SURGERY (OUTPATIENT)
Age: 68
End: 2018-10-09

## 2018-10-09 VITALS
HEART RATE: 74 BPM | HEIGHT: 70 IN | WEIGHT: 153.44 LBS | SYSTOLIC BLOOD PRESSURE: 118 MMHG | RESPIRATION RATE: 16 BRPM | OXYGEN SATURATION: 99 % | TEMPERATURE: 97.7 F | BODY MASS INDEX: 21.97 KG/M2 | DIASTOLIC BLOOD PRESSURE: 70 MMHG

## 2018-10-09 DIAGNOSIS — K62.89 PERIANAL MASS: Primary | ICD-10-CM

## 2018-10-09 LAB — GLUCOSE BLDC GLUCOMTR-MCNC: 86 MG/DL (ref 70–99)

## 2018-10-09 PROCEDURE — 25000128 H RX IP 250 OP 636: Performed by: NURSE ANESTHETIST, CERTIFIED REGISTERED

## 2018-10-09 PROCEDURE — 71000027 ZZH RECOVERY PHASE 2 EACH 15 MINS: Performed by: COLON & RECTAL SURGERY

## 2018-10-09 PROCEDURE — A9270 NON-COVERED ITEM OR SERVICE: HCPCS | Mod: GY | Performed by: COLON & RECTAL SURGERY

## 2018-10-09 PROCEDURE — 82962 GLUCOSE BLOOD TEST: CPT

## 2018-10-09 PROCEDURE — 36000053 ZZH SURGERY LEVEL 2 EA 15 ADDTL MIN - UMMC: Performed by: COLON & RECTAL SURGERY

## 2018-10-09 PROCEDURE — 27210794 ZZH OR GENERAL SUPPLY STERILE: Performed by: COLON & RECTAL SURGERY

## 2018-10-09 PROCEDURE — 25000125 ZZHC RX 250: Performed by: NURSE ANESTHETIST, CERTIFIED REGISTERED

## 2018-10-09 PROCEDURE — 93010 ELECTROCARDIOGRAM REPORT: CPT | Performed by: INTERNAL MEDICINE

## 2018-10-09 PROCEDURE — 25000125 ZZHC RX 250: Performed by: COLON & RECTAL SURGERY

## 2018-10-09 PROCEDURE — 40000065 ZZH STATISTIC EKG NON-CHARGEABLE

## 2018-10-09 PROCEDURE — 25000132 ZZH RX MED GY IP 250 OP 250 PS 637: Mod: GY | Performed by: COLON & RECTAL SURGERY

## 2018-10-09 PROCEDURE — 88331 PATH CONSLTJ SURG 1 BLK 1SPC: CPT | Performed by: COLON & RECTAL SURGERY

## 2018-10-09 PROCEDURE — 25000128 H RX IP 250 OP 636: Performed by: COLON & RECTAL SURGERY

## 2018-10-09 PROCEDURE — 88305 TISSUE EXAM BY PATHOLOGIST: CPT | Performed by: COLON & RECTAL SURGERY

## 2018-10-09 PROCEDURE — 40000170 ZZH STATISTIC PRE-PROCEDURE ASSESSMENT II: Performed by: COLON & RECTAL SURGERY

## 2018-10-09 PROCEDURE — 88312 SPECIAL STAINS GROUP 1: CPT | Performed by: COLON & RECTAL SURGERY

## 2018-10-09 PROCEDURE — 36000051 ZZH SURGERY LEVEL 2 1ST 30 MIN - UMMC: Performed by: COLON & RECTAL SURGERY

## 2018-10-09 PROCEDURE — 37000008 ZZH ANESTHESIA TECHNICAL FEE, 1ST 30 MIN: Performed by: COLON & RECTAL SURGERY

## 2018-10-09 PROCEDURE — 37000009 ZZH ANESTHESIA TECHNICAL FEE, EACH ADDTL 15 MIN: Performed by: COLON & RECTAL SURGERY

## 2018-10-09 RX ORDER — ONDANSETRON 4 MG/1
4 TABLET, ORALLY DISINTEGRATING ORAL
Status: DISCONTINUED | OUTPATIENT
Start: 2018-10-09 | End: 2018-10-09 | Stop reason: HOSPADM

## 2018-10-09 RX ORDER — LIDOCAINE 40 MG/G
CREAM TOPICAL
Status: DISCONTINUED | OUTPATIENT
Start: 2018-10-09 | End: 2018-10-09 | Stop reason: HOSPADM

## 2018-10-09 RX ORDER — CIPROFLOXACIN 2 MG/ML
400 INJECTION, SOLUTION INTRAVENOUS
Status: COMPLETED | OUTPATIENT
Start: 2018-10-09 | End: 2018-10-09

## 2018-10-09 RX ORDER — SODIUM CHLORIDE, SODIUM LACTATE, POTASSIUM CHLORIDE, CALCIUM CHLORIDE 600; 310; 30; 20 MG/100ML; MG/100ML; MG/100ML; MG/100ML
INJECTION, SOLUTION INTRAVENOUS CONTINUOUS PRN
Status: DISCONTINUED | OUTPATIENT
Start: 2018-10-09 | End: 2018-10-09

## 2018-10-09 RX ORDER — GINSENG 100 MG
CAPSULE ORAL PRN
Status: DISCONTINUED | OUTPATIENT
Start: 2018-10-09 | End: 2018-10-09 | Stop reason: HOSPADM

## 2018-10-09 RX ORDER — CIPROFLOXACIN 2 MG/ML
400 INJECTION, SOLUTION INTRAVENOUS SEE ADMIN INSTRUCTIONS
Status: DISCONTINUED | OUTPATIENT
Start: 2018-10-09 | End: 2018-10-09 | Stop reason: HOSPADM

## 2018-10-09 RX ORDER — FENTANYL CITRATE 50 UG/ML
INJECTION, SOLUTION INTRAMUSCULAR; INTRAVENOUS PRN
Status: DISCONTINUED | OUTPATIENT
Start: 2018-10-09 | End: 2018-10-09

## 2018-10-09 RX ORDER — MENTHOL AND ZINC OXIDE .44; 20.625 G/100G; G/100G
OINTMENT TOPICAL
Qty: 71 G | Refills: 0 | Status: SHIPPED | OUTPATIENT
Start: 2018-10-09 | End: 2018-11-09

## 2018-10-09 RX ORDER — IBUPROFEN 400 MG/1
400 TABLET, FILM COATED ORAL 3 TIMES DAILY
Qty: 21 TABLET | Refills: 0 | Status: SHIPPED | OUTPATIENT
Start: 2018-10-09 | End: 2018-10-16

## 2018-10-09 RX ORDER — ONDANSETRON 2 MG/ML
INJECTION INTRAMUSCULAR; INTRAVENOUS PRN
Status: DISCONTINUED | OUTPATIENT
Start: 2018-10-09 | End: 2018-10-09

## 2018-10-09 RX ORDER — ACETAMINOPHEN 325 MG/1
975 TABLET ORAL ONCE
Status: COMPLETED | OUTPATIENT
Start: 2018-10-09 | End: 2018-10-09

## 2018-10-09 RX ORDER — SODIUM CHLORIDE, SODIUM LACTATE, POTASSIUM CHLORIDE, CALCIUM CHLORIDE 600; 310; 30; 20 MG/100ML; MG/100ML; MG/100ML; MG/100ML
INJECTION, SOLUTION INTRAVENOUS CONTINUOUS
Status: DISCONTINUED | OUTPATIENT
Start: 2018-10-09 | End: 2018-10-09 | Stop reason: HOSPADM

## 2018-10-09 RX ORDER — LIDOCAINE HYDROCHLORIDE 20 MG/ML
INJECTION, SOLUTION INFILTRATION; PERINEURAL PRN
Status: DISCONTINUED | OUTPATIENT
Start: 2018-10-09 | End: 2018-10-09

## 2018-10-09 RX ORDER — ACETAMINOPHEN 325 MG/1
650 TABLET ORAL 4 TIMES DAILY
Qty: 56 TABLET | Refills: 0 | Status: SHIPPED | OUTPATIENT
Start: 2018-10-09 | End: 2018-10-16

## 2018-10-09 RX ORDER — PROPOFOL 10 MG/ML
INJECTION, EMULSION INTRAVENOUS CONTINUOUS PRN
Status: DISCONTINUED | OUTPATIENT
Start: 2018-10-09 | End: 2018-10-09

## 2018-10-09 RX ORDER — BUPIVACAINE HYDROCHLORIDE 2.5 MG/ML
INJECTION, SOLUTION INFILTRATION; PERINEURAL PRN
Status: DISCONTINUED | OUTPATIENT
Start: 2018-10-09 | End: 2018-10-09 | Stop reason: HOSPADM

## 2018-10-09 RX ORDER — METRONIDAZOLE 250 MG/1
250 TABLET ORAL 3 TIMES DAILY
Qty: 15 TABLET | Refills: 0 | Status: SHIPPED | OUTPATIENT
Start: 2018-10-09 | End: 2018-10-14

## 2018-10-09 RX ADMIN — FENTANYL CITRATE 50 MCG: 50 INJECTION, SOLUTION INTRAMUSCULAR; INTRAVENOUS at 16:23

## 2018-10-09 RX ADMIN — SODIUM CHLORIDE, POTASSIUM CHLORIDE, SODIUM LACTATE AND CALCIUM CHLORIDE: 600; 310; 30; 20 INJECTION, SOLUTION INTRAVENOUS at 16:10

## 2018-10-09 RX ADMIN — PROPOFOL 50 MCG/KG/MIN: 10 INJECTION, EMULSION INTRAVENOUS at 16:21

## 2018-10-09 RX ADMIN — CIPROFLOXACIN 400 MG: 2 INJECTION, SOLUTION INTRAVENOUS at 14:26

## 2018-10-09 RX ADMIN — BUPIVACAINE HYDROCHLORIDE 10 ML: 2.5 INJECTION, SOLUTION INFILTRATION; PERINEURAL at 16:43

## 2018-10-09 RX ADMIN — PHENYLEPHRINE HYDROCHLORIDE 50 MCG: 10 INJECTION, SOLUTION INTRAMUSCULAR; INTRAVENOUS; SUBCUTANEOUS at 16:48

## 2018-10-09 RX ADMIN — ONDANSETRON 4 MG: 2 INJECTION INTRAMUSCULAR; INTRAVENOUS at 16:20

## 2018-10-09 RX ADMIN — LIDOCAINE HYDROCHLORIDE 40 MG: 20 INJECTION, SOLUTION INFILTRATION; PERINEURAL at 16:20

## 2018-10-09 RX ADMIN — METRONIDAZOLE 500 MG: 500 INJECTION, SOLUTION INTRAVENOUS at 15:38

## 2018-10-09 RX ADMIN — BACITRACIN 1 PACKAGE: 500 OINTMENT TOPICAL at 17:12

## 2018-10-09 RX ADMIN — ACETAMINOPHEN 975 MG: 325 TABLET, FILM COATED ORAL at 14:24

## 2018-10-09 RX ADMIN — BUPIVACAINE HYDROCHLORIDE 20 ML: 2.5 INJECTION, SOLUTION INFILTRATION; PERINEURAL at 17:08

## 2018-10-09 RX ADMIN — FENTANYL CITRATE 50 MCG: 50 INJECTION, SOLUTION INTRAMUSCULAR; INTRAVENOUS at 16:20

## 2018-10-09 NOTE — ANESTHESIA CARE TRANSFER NOTE
Patient: Haseeb Neal    Procedure(s):  Exam Under Anesthesia, Flexible Sigmoidoscopy, Biopsies - Wound Class: II-Clean Contaminated    Diagnosis: Rectal Prolapse   Diagnosis Additional Information: No value filed.    Anesthesia Type:   MAC     Note:  Airway :Room Air  Patient transferred to:Phase II  Handoff Report: Identifed the Patient, Identified the Reponsible Provider, Reviewed the pertinent medical history, Discussed the surgical course, Reviewed Intra-OP anesthesia mangement and issues during anesthesia, Set expectations for post-procedure period and Allowed opportunity for questions and acknowledgement of understanding      Vitals: (Last set prior to Anesthesia Care Transfer)    CRNA VITALS  10/9/2018 1646 - 10/9/2018 1720      10/9/2018             EKG: Sinus rhythm                Electronically Signed By: PARTHA Sandhu CRNA  October 9, 2018  5:20 PM

## 2018-10-09 NOTE — IP AVS SNAPSHOT
MRN:[id number]                      After Visit Summary   10/9/2018    Haseeb Neal    MRN: [id number]           Thank you!     Thank you for choosing [facility] for your care. Our goal is always to provide you with excellent care. Hearing back from our patients is one way we can continue to improve our services. Please take a few minutes to complete the written survey that you may receive in the mail after you visit with us. Thank you!        Patient Information     Date Of Birth          1950        About your hospital stay     You were admitted on:  October 9, 2018 You last received care in the:  Same Day Surgery [facility]    You were discharged on:  October 9, 2018       Who to Call     For medical emergencies, please call 911.  For non-urgent questions about your medical care, please call your primary care provider or clinic, [phone]  For questions related to your surgery, please call your surgery clinic        Attending Provider     Provider Specialty    Radu Asher MD Colon and Rectal Surgery       Primary Care Provider Office Phone # Fax #    [facility]y [facility] [phone]      After Care Instructions     Diet Instructions       Resume pre-procedure diet            Discharge Instructions       Follow up appointment as instructed by Surgeon and/or RN            Dressing       Keep dry folded 4x4 gauze or cotton ball on anus and change twice daily, and as needed.            No Alcohol       For 24 hours after procedure and if taking narcotic pain medications or Metronidazole (FLAGYL).            Sitz bath       Start sitz baths the night of surgery and perform 2-3 times per day, and after bowel movements. Sit down in 8-10 inches of warm water (no need to add soap or salts) in the bathtub for 5 minutes at a time. You can also use a removable showerhead for the same purpose.                  Further instructions from your care team        Anorectal Surgery Instructions    What can I expect after anorectal surgery?  Most anorectal procedures are done as outpatient surgery, and you go home the same day as the procedure. A few surgical procedures will require that you stay in the hospital for about one to three days. No matter where the procedure is done or how long or short it takes, these recommendations will help you heal and feel more comfortable.    Medicines:  The anal area is very sensitive; you can expect to have some pain for up to 2-4 weeks after the procedure. Your doctor will give you a prescription for one or more pain medications.    Take naprosyn 500 mg twice a day OR ibuprofen 600 mg four times a day     Take this on a regular basis (not as needed) following your surgery.     The drugs are best taken with food.  Do not take if it causes stomach upset or if you have a history of ulcers or gastritis. You can stop the naprosyn (or ibuprofen) or reduce the dose when you are feeling better.    DO NOT use naprosyn, ibuprofen, or other similar agents (eg. Advil or Aleve) if you have inflammatory bowel disease (Ulcerative Colitis or Crohn's disease) or if your doctor as advised you against using these medications    Take acetominaphen (Tylenol) 650-1000 mg four times a day.     Take this on a regular basis (not as needed) following surgery for pain control.     Take the lower dose if you are >65 years old or have liver disease. The maximum dose of acetominaphen is 4000 mg a day. You can stop the acetaminophen or reduce the dose when you are feeling better.    It is important to realize that many narcotic pain relievers (including vicodin, percocet, tylenol #3) also have acetaminophen, and excessive doses of acetaminophen can be dangerous, so do not take these in addition to acetominaphen.  You may take narcotics that don't contain acetominaphen such as oxycodone.      Take oxycodone AS NEEDED in addition to the acetominaphen and naprosyn.       Because narcotics have side effects (including constipation), you should reduce your use of these medications as tolerated as your pain improves.    *In general, the best strategy is to take (if you are able to tolerate it) the tylenol and naprosen on a regular basis until your pain has largely gone away. You can take the narcotic pain medicine as needed in addition to the tylenol and ibuprofen. As your pain begins to lessen, you should cut back on your narcotic use while continuing to take your regular tylenol and naprosyn doses.      Refilling prescriptions. If you need additional pain medication, please call the triage nurse at [phone] during normal business hours (8 a.m. to 4 p.m., Monday though Friday) or have your pharmacy fax a refill request to [phone]. If you call after hours or on the weekends, the doctor on call may not know you personally and may not renew narcotic pain medication by phone. Call your primary care provider for all other medication refills.    Perineal care:  External gauze dressing can be removed the morning after surgery. If you have an adhesive dressing stuck to the incision, DO NOT remove this.   Tub baths:    If possible, take a tub bath immediately after each bowel movement.     Baths should be take at least 3 times daily for the first week to 10 days following your procedure. You should soak in the tub for 10 to 15 minutes each time with water as warm as you can tolerate.     Even after you go back to work, it is a good idea to sit in the tub in the morning, after returning from work, and again in the evening before bedtime.    Bleeding/Infection:    You can expect to have some bleeding after bowel movements, but it should stop soon after you wipe.     Use a wet cloth or perianal pad (Tucks or Preparation H pads) to gently wipe the area after each bowel movement.    Do not rub the anal area or use a lot of pressure.    Using a spray bottle filled with warm water helps  loosen any remaining stool. Blot gently with a soft dry cloth or tissue paper.    Infection around the anal opening is not very common. The anal area has excellent blood supply, which helps the area to heal. Bloody discharge after bowel movements is normal and may last 2 to 4 weeks after your surgery. However, if you bleed between bowel movements and cannot get it to stop, call the triage nurse immediately [phone].    Bowel function:  Take a fiber supplement such as Metamucil, which is over the counter. It is important to drink six to eight glasses of water or juice everyday when using fiber products.    If you do not have a bowel movement after 1-2 days:    Take Milk of Magnesia-2 tablespoons.       If there are no results, repeat this or add over the counter Miralax.      If you still do not have results, contact the clinic.     If there are no results, repeat this. Stop taking Milk of Magnesia or other laxatives if you begin to have diarrhea.    * Constipation will cause you to strain when you have a bowel movement. The hard stool will be difficult to pass, will increase pain and bleeding, and will slow down healing.  Try to avoid constipation and/or diarrhea as this can make the pain and bleeding worse.    * It is important to have regular bowel movements at least every other day and to keep your stool soft.  A high fiber diet, including at least four servings of fruits or vegetables daily, will help to keep your bowel movements regular and soft.    Activity:  After your procedure, there are no restrictions on your activity     except restrictions surrounding being on narcotics and in pain, such as no heavy machine operating or driving.     You may walk, climb stairs, ride in a car, and sit as tolerated.     It is helpful to avoid sitting in one position for long periods (2 or more hours).    After some surgeries, you may be told not to perform any lifting (more than 10 pounds) for several weeks after  surgery.    When to call:  When do I need to call the doctor or triage nurse?    If you experience any of the problems listed here, call our triage nurse during business hours ([phone]).     The nurse will help you with your problem or have the doctor call you.     After hours and on weekends, please call the main hospital number ([phone]) and ask for the colon and rectal surgery person on call.     Some is available to help you 24 hours a day, seven days a week.    Call for:   ? Fever greater than 101 degrees   ? Chills   ? Foul-smelling drainage   ? Nausea and vomiting   ? Diarrhea - greater than 3 water stools in 24 hours   ? Constipation - no bowel movement after 3 days   ? Severe bleeding that does not stop soon after a bowel movement   ? Problems with the incision, including increased pain, swelling, or redness    [facility]  Same-Day Surgery   Adult Discharge Orders & Instructions     For 24 hours after surgery    1. Get plenty of rest.  A responsible adult must stay with you for at least 24 hours after you leave the hospital.   2. Do not drive or use heavy equipment.  If you have weakness or tingling, don't drive or use heavy equipment until this feeling goes away.  3. Do not drink alcohol.  4. Avoid strenuous or risky activities.  Ask for help when climbing stairs.   5. You may feel lightheaded.  IF so, sit for a few minutes before standing.  Have someone help you get up.   6. If you have nausea (feel sick to your stomach): Drink only clear liquids such as apple juice, ginger ale, broth or 7-Up.  Rest may also help.  Be sure to drink enough fluids.  Move to a regular diet as you feel able.  7. You may have a slight fever. Call the doctor if your fever is over 100 F (37.7 C) (taken under the tongue) or lasts longer than 24 hours.  8. You may have a dry mouth, a sore throat, muscle aches or trouble sleeping.  These should go away after 24 hours.  9. Do not make  "important or legal decisions.   Call your doctor for any of the followin.  Signs of infection (fever, growing tenderness at the surgery site, a large amount of drainage or bleeding, severe pain, foul-smelling drainage, redness, swelling).    2. It has been over 8 to 10 hours since surgery and you are still not able to urinate (pass water).    3.  Headache for over 24 hours.      To contact a doctor, call _Dr Asher's office at [phone]  __ or:        [phone] and ask for the resident on call for   _Colo/Rectal_ (answered 24 hours a day)      Emergency Department:    [facility]: [phone]       (TTY for hearing impaired: [phone])    [facility]: [phone]       (TTY for hearing impaired: [phone])              Pending Results     Date and Time Order Name Status Description    10/9/2018 1643 Surgical pathology exam In process     10/9/2018 1347 EKG 12-lead, tracing only Preliminary             Admission Information     Date & Time Provider Department Dept. Phone    10/9/2018 Radu Asher MD Same Day Surgery [facility] [place] [phone]      Your Vitals Were     Blood Pressure Pulse Temperature Respirations Height Weight    115/72 74 97.7  F (36.5  C) (Oral) 16 1.778 m (5' 10\") 69.6 kg (153 lb 7 oz)    Pulse Oximetry BMI (Body Mass Index)                100% 22.02 kg/m2          [employer] Information     [employer] lets you send messages to your doctor, view your test results, renew your prescriptions, schedule appointments and more. To sign up, go to [email or web address] . Click on \"Log in\" on the left side of the screen, which will take you to the Welcome page. Then click on \"Sign up Now\" on the right side of the page.     You will be asked to enter the access code listed below, as well as some personal information. Please follow the directions to create your username and password.     Your access code is: [email or web address]  Expires: 2018  6:30 AM     Your access " code will  in 90 days. If you need help or a new code, please call your [facility] clinic or [phone].        Care EveryWhere ID     This is your Care EveryWhere ID. This could be used by other organizations to access your [facility] medical records  [id number]        Equal Access to Services     MAKENZIE GAMA : Hadii aad ku hadmalao Soenedinaali, waaxda luqadaha, qaybta kaalmada adeegyada, edison munguiamelaniaelton nicholas. So [facility] [phone].    ATENCIÓN: Si habla español, tiene a rogers disposición servicios gratuitos de asistencia lingüística. Martha al [phone].    We comply with applicable federal civil rights laws and [place] laws. We do not discriminate on the basis of race, color, national origin, age, disability, sex, sexual orientation, or gender identity.               Review of your medicines      START taking        Dose / Directions    acetaminophen 325 MG tablet   Commonly known as:  TYLENOL   Used for:  Perianal mass        Dose:  650 mg   Take 2 tablets (650 mg) by mouth 4 times daily for 7 days Alternate with ibuprofen (ADVIL/MOTRIN), IF ordered.   Quantity:  56 tablet   Refills:  0       ibuprofen 400 MG tablet   Commonly known as:  ADVIL/MOTRIN   Used for:  Perianal mass        Dose:  400 mg   Take 1 tablet (400 mg) by mouth 3 times daily for 7 days Alternate with acetaminophen (TYLENOL), IF ordered.   Quantity:  21 tablet   Refills:  0       menthol-zinc oxide 0.44-20.625 % Oint ointment   Commonly known as:  CALMOSEPTINE   Used for:  Perianal mass        Apply pea-size amount to anus and surrounding skin three times daily as needed for skin irritation.   Quantity:  71 g   Refills:  0       metroNIDAZOLE 250 MG tablet   Commonly known as:  FLAGYL   Used for:  Perianal mass        Dose:  250 mg   Take 1 tablet (250 mg) by mouth 3 times daily for 5 days   Quantity:  15 tablet   Refills:  0         CONTINUE these medicines which have NOT CHANGED        Dose / Directions     atorvastatin 80 MG tablet   Commonly known as:  LIPITOR        Dose:  80 mg   Take 80 mg by mouth every morning   Refills:  0       fluconazole 100 MG tablet   Commonly known as:  DIFLUCAN        Dose:  100 mg   Take 100 mg by mouth daily   Refills:  0       fluticasone-vilanterol 100-25 MCG/INH inhaler   Commonly known as:  BREO ELLIPTA        Dose:  1 puff   Inhale 1 puff into the lungs every evening   Refills:  0       INCRUSE ELLIPTA 62.5 MCG/INH inhaler   Generic drug:  umeclidinium        Dose:  1 puff   Inhale 1 puff into the lungs daily   Refills:  0       metoprolol tartrate 25 MG tablet   Commonly known as:  LOPRESSOR        Dose:  25 mg   Take 25 mg by mouth every morning   Refills:  0       tamsulosin 0.4 MG capsule   Commonly known as:  FLOMAX        Dose:  0.4 mg   Take 0.4 mg by mouth every morning   Refills:  0       VENTOLIN  (90 Base) MCG/ACT inhaler   Generic drug:  albuterol        Dose:  1 puff   Inhale 1 puff into the lungs every 4 hours as needed   Refills:  0         STOP taking     aspirin 81 MG tablet                Where to get your medicines      These medications were sent to [facility] [place], [place] - [address] [address]     Phone:  [phone]     acetaminophen 325 MG tablet    ibuprofen 400 MG tablet    menthol-zinc oxide 0.44-20.625 % Oint ointment    metroNIDAZOLE 250 MG tablet                Protect others around you: Learn how to safely use, store and throw away your medicines at www.disposemymeds.org.        ANTIBIOTIC INSTRUCTION     You've Been Prescribed an Antibiotic - Now What?  Your healthcare team thinks that you or your loved one might have an infection. Some infections can be treated with antibiotics, which are powerful, life-saving drugs. Like all medications, antibiotics have side effects and should only be used when necessary. There are some important things you should know about your antibiotic  treatment.      Your healthcare team may run tests before you start taking an antibiotic.    Your team may take samples (e.g., from your blood, urine or other areas) to run tests to look for bacteria. These test can be important to determine if you need an antibiotic at all and, if you do, which antibiotic will work best.      Within a few days, your healthcare team might change or even stop your antibiotic.    Your team may start you on an antibiotic while they are working to find out what is making you sick.    Your team might change your antibiotic because test results show that a different antibiotic would be better to treat your infection.    In some cases, once your team has more information, they learn that you do not need an antibiotic at all. They may find out that you don't have an infection, or that the antibiotic you're taking won't work against your infection. For example, an infection caused by a virus can't be treated with antibiotics. Staying on an antibiotic when you don't need it is more likely to be harmful than helpful.      You may experience side effects from your antibiotic.    Like all medications, antibiotics have side effects. Some of these can be serious.    Let you healthcare team know if you have any known allergies when you are admitted to the hospital.    One significant side effect of nearly all antibiotics is the risk of severe and sometimes deadly diarrhea caused by Clostridium difficile (C. Difficile). This occurs when a person takes antibiotics because some good germs are destroyed. Antibiotic use allows C. diificile to take over, putting patients at high risk for this serious infection.    As a patient or caregiver, it is important to understand your or your loved one's antibiotic treatment. It is especially important for caregivers to speak up when patients can't speak for themselves. Here are some important questions to ask your healthcare team.    What infection is this  antibiotic treating and how do you know I have that infection?    What side effects might occur from this antibiotic?    How long will I need to take this antibiotic?    Is it safe to take this antibiotic with other medications or supplements (e.g., vitamins) that I am taking?     Are there any special directions I need to know about taking this antibiotic? For example, should I take it with food?    How will I be monitored to know whether my infection is responding to the antibiotic?    What tests may help to make sure the right antibiotic is prescribed for me?      Information provided by:  www.cdc.gov/getsmart  U.S. Department of Health and Human Services  Centers for disease Control and Prevention  National Center for Emerging and Zoonotic Infectious Diseases  Division of Healthcare Quality Promotion             Medication List: This is a list of all your medications and when to take them. Check marks below indicate your daily home schedule. Keep this list as a reference.      Medications           Morning Afternoon Evening Bedtime As Needed    acetaminophen 325 MG tablet   Commonly known as:  TYLENOL   Take 2 tablets (650 mg) by mouth 4 times daily for 7 days Alternate with ibuprofen (ADVIL/MOTRIN), IF ordered.   Last time this was given:  975 mg on 10/9/2018  2:24 PM                                atorvastatin 80 MG tablet   Commonly known as:  LIPITOR   Take 80 mg by mouth every morning                                fluconazole 100 MG tablet   Commonly known as:  DIFLUCAN   Take 100 mg by mouth daily                                fluticasone-vilanterol 100-25 MCG/INH inhaler   Commonly known as:  BREO ELLIPTA   Inhale 1 puff into the lungs every evening                                ibuprofen 400 MG tablet   Commonly known as:  ADVIL/MOTRIN   Take 1 tablet (400 mg) by mouth 3 times daily for 7 days Alternate with acetaminophen (TYLENOL), IF ordered.                                INCRUSE ELLIPTA 62.5  MCG/INH inhaler   Inhale 1 puff into the lungs daily   Generic drug:  umeclidinium                                menthol-zinc oxide 0.44-20.625 % Oint ointment   Commonly known as:  CALMOSEPTINE   Apply pea-size amount to anus and surrounding skin three times daily as needed for skin irritation.                                metoprolol tartrate 25 MG tablet   Commonly known as:  LOPRESSOR   Take 25 mg by mouth every morning                                metroNIDAZOLE 250 MG tablet   Commonly known as:  FLAGYL   Take 1 tablet (250 mg) by mouth 3 times daily for 5 days                                tamsulosin 0.4 MG capsule   Commonly known as:  FLOMAX   Take 0.4 mg by mouth every morning                                VENTOLIN  (90 Base) MCG/ACT inhaler   Inhale 1 puff into the lungs every 4 hours as needed   Generic drug:  albuterol

## 2018-10-09 NOTE — DISCHARGE INSTRUCTIONS
Anorectal Surgery Instructions    What can I expect after anorectal surgery?  Most anorectal procedures are done as outpatient surgery, and you go home the same day as the procedure. A few surgical procedures will require that you stay in the hospital for about one to three days. No matter where the procedure is done or how long or short it takes, these recommendations will help you heal and feel more comfortable.    Medicines:  The anal area is very sensitive; you can expect to have some pain for up to 2-4 weeks after the procedure. Your doctor will give you a prescription for one or more pain medications.    Take naprosyn 500 mg twice a day OR ibuprofen 600 mg four times a day     Take this on a regular basis (not as needed) following your surgery.     The drugs are best taken with food.  Do not take if it causes stomach upset or if you have a history of ulcers or gastritis. You can stop the naprosyn (or ibuprofen) or reduce the dose when you are feeling better.    DO NOT use naprosyn, ibuprofen, or other similar agents (eg. Advil or Aleve) if you have inflammatory bowel disease (Ulcerative Colitis or Crohn's disease) or if your doctor as advised you against using these medications    Take acetominaphen (Tylenol) 650-1000 mg four times a day.     Take this on a regular basis (not as needed) following surgery for pain control.     Take the lower dose if you are >65 years old or have liver disease. The maximum dose of acetominaphen is 4000 mg a day. You can stop the acetaminophen or reduce the dose when you are feeling better.    It is important to realize that many narcotic pain relievers (including vicodin, percocet, tylenol #3) also have acetaminophen, and excessive doses of acetaminophen can be dangerous, so do not take these in addition to acetominaphen.  You may take narcotics that don't contain acetominaphen such as oxycodone.      Take oxycodone AS NEEDED in addition to the acetominaphen and naprosyn.       Because narcotics have side effects (including constipation), you should reduce your use of these medications as tolerated as your pain improves.    *In general, the best strategy is to take (if you are able to tolerate it) the tylenol and naprosen on a regular basis until your pain has largely gone away. You can take the narcotic pain medicine as needed in addition to the tylenol and ibuprofen. As your pain begins to lessen, you should cut back on your narcotic use while continuing to take your regular tylenol and naprosyn doses.      Refilling prescriptions. If you need additional pain medication, please call the triage nurse at [phone] during normal business hours (8 a.m. to 4 p.m., Monday though Friday) or have your pharmacy fax a refill request to [phone]. If you call after hours or on the weekends, the doctor on call may not know you personally and may not renew narcotic pain medication by phone. Call your primary care provider for all other medication refills.    Perineal care:  External gauze dressing can be removed the morning after surgery. If you have an adhesive dressing stuck to the incision, DO NOT remove this.   Tub baths:    If possible, take a tub bath immediately after each bowel movement.     Baths should be take at least 3 times daily for the first week to 10 days following your procedure. You should soak in the tub for 10 to 15 minutes each time with water as warm as you can tolerate.     Even after you go back to work, it is a good idea to sit in the tub in the morning, after returning from work, and again in the evening before bedtime.    Bleeding/Infection:    You can expect to have some bleeding after bowel movements, but it should stop soon after you wipe.     Use a wet cloth or perianal pad (Tucks or Preparation H pads) to gently wipe the area after each bowel movement.    Do not rub the anal area or use a lot of pressure.    Using a spray bottle filled with warm water helps  loosen any remaining stool. Blot gently with a soft dry cloth or tissue paper.    Infection around the anal opening is not very common. The anal area has excellent blood supply, which helps the area to heal. Bloody discharge after bowel movements is normal and may last 2 to 4 weeks after your surgery. However, if you bleed between bowel movements and cannot get it to stop, call the triage nurse immediately [phone].    Bowel function:  Take a fiber supplement such as Metamucil, which is over the counter. It is important to drink six to eight glasses of water or juice everyday when using fiber products.    If you do not have a bowel movement after 1-2 days:    Take Milk of Magnesia-2 tablespoons.       If there are no results, repeat this or add over the counter Miralax.      If you still do not have results, contact the clinic.     If there are no results, repeat this. Stop taking Milk of Magnesia or other laxatives if you begin to have diarrhea.    * Constipation will cause you to strain when you have a bowel movement. The hard stool will be difficult to pass, will increase pain and bleeding, and will slow down healing.  Try to avoid constipation and/or diarrhea as this can make the pain and bleeding worse.    * It is important to have regular bowel movements at least every other day and to keep your stool soft.  A high fiber diet, including at least four servings of fruits or vegetables daily, will help to keep your bowel movements regular and soft.    Activity:  After your procedure, there are no restrictions on your activity     except restrictions surrounding being on narcotics and in pain, such as no heavy machine operating or driving.     You may walk, climb stairs, ride in a car, and sit as tolerated.     It is helpful to avoid sitting in one position for long periods (2 or more hours).    After some surgeries, you may be told not to perform any lifting (more than 10 pounds) for several weeks after  surgery.    When to call:  When do I need to call the doctor or triage nurse?    If you experience any of the problems listed here, call our triage nurse during business hours ([phone]).     The nurse will help you with your problem or have the doctor call you.     After hours and on weekends, please call the main hospital number ([phone]) and ask for the colon and rectal surgery person on call.     Some is available to help you 24 hours a day, seven days a week.    Call for:   ? Fever greater than 101 degrees   ? Chills   ? Foul-smelling drainage   ? Nausea and vomiting   ? Diarrhea - greater than 3 water stools in 24 hours   ? Constipation - no bowel movement after 3 days   ? Severe bleeding that does not stop soon after a bowel movement   ? Problems with the incision, including increased pain, swelling, or redness    [facility]  Same-Day Surgery   Adult Discharge Orders & Instructions     For 24 hours after surgery    1. Get plenty of rest.  A responsible adult must stay with you for at least 24 hours after you leave the hospital.   2. Do not drive or use heavy equipment.  If you have weakness or tingling, don't drive or use heavy equipment until this feeling goes away.  3. Do not drink alcohol.  4. Avoid strenuous or risky activities.  Ask for help when climbing stairs.   5. You may feel lightheaded.  IF so, sit for a few minutes before standing.  Have someone help you get up.   6. If you have nausea (feel sick to your stomach): Drink only clear liquids such as apple juice, ginger ale, broth or 7-Up.  Rest may also help.  Be sure to drink enough fluids.  Move to a regular diet as you feel able.  7. You may have a slight fever. Call the doctor if your fever is over 100 F (37.7 C) (taken under the tongue) or lasts longer than 24 hours.  8. You may have a dry mouth, a sore throat, muscle aches or trouble sleeping.  These should go away after 24 hours.  9. Do not make  important or legal decisions.   Call your doctor for any of the followin.  Signs of infection (fever, growing tenderness at the surgery site, a large amount of drainage or bleeding, severe pain, foul-smelling drainage, redness, swelling).    2. It has been over 8 to 10 hours since surgery and you are still not able to urinate (pass water).    3.  Headache for over 24 hours.      To contact a doctor, call _Dr Asher's office at [phone]  __ or:        [phone] and ask for the resident on call for   _Colo/Rectal_ (answered 24 hours a day)      Emergency Department:    [facility]: [phone]       (TTY for hearing impaired: [phone])    [facility]: [phone]       (TTY for hearing impaired: [phone])

## 2018-10-09 NOTE — OP NOTE
Procedure Date: 10/09/2018.      PREOPERATIVE DIAGNOSES:   1.  Perianal pain.   2.  Perianal mass.   3.  Hypertension.   4.  Dyslipidemia.   5.  Heart failure.   6.  Atherosclerosis (status post aortobifemoral bypass).      POSTOPERATIVE DIAGNOSES:   1.  Perianal pain.   2.  Perianal mass.   3.  Hypertension.   4.  Dyslipidemia.   5.  Heart failure.   6.  Atherosclerosis (status post aortobifemoral bypass).      ANESTHESIA:  MAC plus local anesthetic.      PROCEDURES PERFORMED:   1.  Evaluation under anesthesia.   2.  Perianal biopsies.   3.  Flexible sigmoidoscopy.      SURGEON:  Radu Asher MD      ASSISTANT:  Joi Rosado MD (Colon and Rectal Surgery Fellow).      BRIEF HISTORY:  Elver is a 67-year-old pleasant gentleman who presented to his primary care doctor approximately 6 months ago for hemorrhoids.  His symptoms were progressive anal pain associated with intermittent fecal seepage, mainly of mucus.  He denied any blood per rectum, fecal urgency or incontinence.  He did not have any recent weight loss or urinary symptoms.  He was having 1-2 soft bowel movements a day.  His past medical history was significant for coronary artery disease, myocardial infarction, hypertension, chronic obstructive pulmonary disease, dyslipidemia and an aortic aneurysm (status post aortobifemoral bypass).  He also has a chronic history of tobacco use, but he quit in 2016.  His last colonoscopy was in 2015 and it showed 3 small tubular adenomas.  He denied any previous anorectal operations.  Upon examination, there was a 2 x 2 cm area of induration at the right anterior aspect of the anal verge.  There was no mass per se.  He did not tolerate an anorectal exam in clinic.  He subsequently underwent an MR of the pelvis that showed a 4.3 cm mass at the right posterior lateral anus, extending from the internal anal sphincter to the external sphincter up to the right gluteal fold.  The mass also approached the puborectalis  "muscle posteriorly.  These findings were concerning for malignancy.  I subsequently recommended evaluation under anesthesia with possible biopsies.  I thoroughly discussed the risks, benefits and alternatives of operative treatment with Elver, he agreed to proceed.      DESCRIPTION OF PROCEDURE:  After obtaining informed consent, the patient was brought to the operating room and placed in the prone jackknife position.  MAC anesthesia was gently induced without difficulty.  The patient received appropriate preoperative antibiotic prophylaxis, as well as mechanical DVT prophylaxis.  Bilateral lower extremity pneumatic compression devices were applied and all pressure points were cushioned.  The perianal area was prepped and draped in a standard sterile fashion.  A \"timeout\" was performed.  Digital rectal exam, as well as anoscopy, was performed.  These revealed superficial fissuring of the entire perianal skin, most notably at the right aspect of the anus.  There was quite a bit of induration at the right lateral aspect of the perianal skin approximately 2 cm from the anal verge.  This area also correlated with some fissuring and induration of the skin.  We did not palpate any masses in the anal canal, and the distal rectal mucosa appeared to be intact.  There was no evidence of active infection, abscess, or fistula.  There was some superficial fissuring at the posterior and anterior midline, but this was mainly related to recent trauma from the anoscopy.  Given these findings, we performed a skin biopsy of the thickened perianal skin at the right lateral aspect of the anus.  These were sent for frozen section and it showed inflammation, but no evidence of malignancy.  We also performed Jeremy-Cut biopsies x 4 of the right lateral perianal induration.  These were sent for permanent pathology.  Hemostasis was corroborated after this.      A pediatric colonoscope was then passed transanally all the way up to the descending " colon.  The quality of the prep was fair.  We did see some mild sigmoid diverticulosis with no evidence of bleeding or inflammation.  There was no evidence of ulceration, inflammation, or neoplasms.  Retroflexion was performed in the rectum.  No additional findings were seen.  No additional biopsies were taken.  The colonoscope was then removed.  Hemostasis was corroborated.  Instrument, sponge, and needle counts were all correct as reported to me.  Bacitracin was applied to the anus, as well as a sterile dressing.  The patient tolerated the procedure well.      COMPLICATIONS:  None immediately.      ESTIMATED BLOOD LOSS:  5 mL       REPLACEMENT:  500 mL of crystalloid.      DRAINS AND TUBES:  None.        FINDINGS:  Induration of the right lateral perianal skin with no discrete mass per se.  Superficial fissuring of the perianal skin.  No evidence of abscess or fistula.  Frozen section biopsies of the right lateral perianal skin showed inflammation with no evidence of malignancy.  We will await final pathology on perianal skin biopsies, as well as the Jeremy-Cut needle biopsies.      DISPOSITION:  PACU.         GEOVANNI SHORT MD             D: 10/09/2018   T: 10/09/2018   MT: KATHARINA      Name:     AVTAR RAVI   MRN:      [id number]        Account:        [id number]   :      1950           Procedure Date: 10/09/2018      Document: [id number]       cc: Jimy Hill MD       [facility] Belem Zhu DO

## 2018-10-09 NOTE — IP AVS SNAPSHOT
Same Day Surgery [address] [address]    Phone:  [phone]                                       After Visit Summary   10/9/2018    Haseeb Neal    MRN: [id number]           After Visit Summary Signature Page     I have received my discharge instructions, and my questions have been answered. I have discussed any challenges I see with this plan with the nurse or doctor.    ..........................................................................................................................................  Patient/Patient Representative Signature      ..........................................................................................................................................  Patient Representative Print Name and Relationship to Patient    ..................................................               ................................................  Date                                   Time    ..........................................................................................................................................  Reviewed by Signature/Title    ...................................................              ..............................................  Date                                               Time          22EPIC Rev 08/18

## 2018-10-09 NOTE — ANESTHESIA POSTPROCEDURE EVALUATION
Patient: Haseeb Neal    Procedure(s):  Exam Under Anesthesia, Flexible Sigmoidoscopy, Biopsies - Wound Class: II-Clean Contaminated    Diagnosis:Rectal Prolapse   Diagnosis Additional Information: No value filed.    Anesthesia Type:  MAC    Note:  Anesthesia Post Evaluation    Patient location during evaluation: Phase 2  Patient participation: Able to fully participate in evaluation  Level of consciousness: awake and alert  Pain management: adequate  Airway patency: patent  Cardiovascular status: acceptable  Respiratory status: acceptable  Hydration status: acceptable  PONV: none     Anesthetic complications: None          Last vitals:  Vitals:    10/09/18 1300 10/09/18 1719   BP: 107/66 115/72   Pulse: 74    Resp: 18 16   Temp: 36.7  C (98  F) 36.5  C (97.7  F)   SpO2: 97% 100%         Electronically Signed By: Carol Ambrosio MD  October 9, 2018  5:52 PM

## 2018-10-09 NOTE — BRIEF OP NOTE
[facility], [place]    Brief Operative Note    Pre-operative diagnosis: Rectal Prolapse, anal mass   Post-operative diagnosis Perianal inflammation and chronic skin changes with lichenification  Procedure: Procedure(s):  Exam Under Anesthesia, Flexible Sigmoidoscopy, Biopsies - Wound Class: II-Clean Contaminated  Surgeon: Surgeon(s) and Role:     * Radu Asher MD - Primary     * Joi Rosado MD - Assisting  Anesthesia: Monitor Anesthesia Care   Estimated blood loss: 5cc  Drains: None  Specimens:   ID Type Source Tests Collected by Time Destination   A : Right lateral perianal skin Tissue Other SURGICAL PATHOLOGY EXAM Radu Asher MD 10/9/2018  4:42 PM    B : Right anal mass Tissue Other SURGICAL PATHOLOGY EXAM Radu Asher MD 10/9/2018  4:52 PM      Findings:   Lichenificiation of perianal skin worse on right, no clear mass, no fluctuance, frozen section with inflammatory changes, truecut biopsies also performed. Flex sig to 40cm with no abnormalities.  Complications: None.  Implants: None.

## 2018-10-11 DIAGNOSIS — K62.89 ANAL PAIN: Primary | ICD-10-CM

## 2018-10-11 LAB — INTERPRETATION ECG - MUSE: NORMAL

## 2018-10-12 LAB — COPATH REPORT: NORMAL

## 2018-11-06 ENCOUNTER — PATIENT OUTREACH (OUTPATIENT)
Dept: CARE COORDINATION | Facility: CLINIC | Age: 68
End: 2018-11-06

## 2018-11-08 ENCOUNTER — TELEPHONE (OUTPATIENT)
Dept: INFECTIOUS DISEASES | Facility: CLINIC | Age: 68
End: 2018-11-08

## 2018-11-08 NOTE — TELEPHONE ENCOUNTER
Patient contacted and reminded of upcoming appointment.  Patient confirmed they will be attending.  Patient instructed to bring updated medications list to appointment.  De Patel MA

## 2018-11-09 ENCOUNTER — OFFICE VISIT (OUTPATIENT)
Dept: INFECTIOUS DISEASES | Facility: CLINIC | Age: 68
End: 2018-11-09
Attending: INTERNAL MEDICINE
Payer: MEDICARE

## 2018-11-09 ENCOUNTER — RADIANT APPOINTMENT (OUTPATIENT)
Dept: GENERAL RADIOLOGY | Facility: CLINIC | Age: 68
End: 2018-11-09
Payer: COMMERCIAL

## 2018-11-09 VITALS
HEART RATE: 87 BPM | BODY MASS INDEX: 22.62 KG/M2 | HEIGHT: 70 IN | DIASTOLIC BLOOD PRESSURE: 64 MMHG | SYSTOLIC BLOOD PRESSURE: 99 MMHG | TEMPERATURE: 98.4 F | OXYGEN SATURATION: 97 % | WEIGHT: 158 LBS

## 2018-11-09 DIAGNOSIS — B39.9 HISTOPLASMOSIS: ICD-10-CM

## 2018-11-09 DIAGNOSIS — K62.89 ANAL PAIN: ICD-10-CM

## 2018-11-09 DIAGNOSIS — B39.9 HISTOPLASMOSIS: Primary | ICD-10-CM

## 2018-11-09 PROCEDURE — 86612 BLASTOMYCES ANTIBODY: CPT | Performed by: STUDENT IN AN ORGANIZED HEALTH CARE EDUCATION/TRAINING PROGRAM

## 2018-11-09 PROCEDURE — 87449 NOS EACH ORGANISM AG IA: CPT | Performed by: STUDENT IN AN ORGANIZED HEALTH CARE EDUCATION/TRAINING PROGRAM

## 2018-11-09 PROCEDURE — 86606 ASPERGILLUS ANTIBODY: CPT | Performed by: STUDENT IN AN ORGANIZED HEALTH CARE EDUCATION/TRAINING PROGRAM

## 2018-11-09 PROCEDURE — 36415 COLL VENOUS BLD VENIPUNCTURE: CPT | Performed by: STUDENT IN AN ORGANIZED HEALTH CARE EDUCATION/TRAINING PROGRAM

## 2018-11-09 PROCEDURE — 86360 T CELL ABSOLUTE COUNT/RATIO: CPT | Performed by: STUDENT IN AN ORGANIZED HEALTH CARE EDUCATION/TRAINING PROGRAM

## 2018-11-09 PROCEDURE — 82784 ASSAY IGA/IGD/IGG/IGM EACH: CPT | Performed by: STUDENT IN AN ORGANIZED HEALTH CARE EDUCATION/TRAINING PROGRAM

## 2018-11-09 PROCEDURE — 86635 COCCIDIOIDES ANTIBODY: CPT | Performed by: STUDENT IN AN ORGANIZED HEALTH CARE EDUCATION/TRAINING PROGRAM

## 2018-11-09 PROCEDURE — 87385 HISTOPLASMA CAPSUL AG IA: CPT | Performed by: STUDENT IN AN ORGANIZED HEALTH CARE EDUCATION/TRAINING PROGRAM

## 2018-11-09 PROCEDURE — 87389 HIV-1 AG W/HIV-1&-2 AB AG IA: CPT | Performed by: STUDENT IN AN ORGANIZED HEALTH CARE EDUCATION/TRAINING PROGRAM

## 2018-11-09 PROCEDURE — G0463 HOSPITAL OUTPT CLINIC VISIT: HCPCS | Mod: ZF

## 2018-11-09 PROCEDURE — 86698 HISTOPLASMA ANTIBODY: CPT | Performed by: STUDENT IN AN ORGANIZED HEALTH CARE EDUCATION/TRAINING PROGRAM

## 2018-11-09 PROCEDURE — 86359 T CELLS TOTAL COUNT: CPT | Performed by: STUDENT IN AN ORGANIZED HEALTH CARE EDUCATION/TRAINING PROGRAM

## 2018-11-09 RX ORDER — ITRACONAZOLE 100 MG/1
100 CAPSULE ORAL 2 TIMES DAILY
Qty: 90 CAPSULE | Refills: 1 | Status: SHIPPED | OUTPATIENT
Start: 2018-11-09

## 2018-11-09 ASSESSMENT — PAIN SCALES - GENERAL: PAINLEVEL: NO PAIN (0)

## 2018-11-09 NOTE — LETTER
"11/9/2018      RE: Haseeb Neal  [address]  [facility] [address]       [facility]   Infectious Disease Clinic Note:  New Patient     Patient:  Haseeb Neal, Date of birth 1950, Medical record number [id number]  Date of Visit:  11/09/2018  Consult requested by Dr. 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     1. Anal/rectal histoplasmosis: Differential for fungal organisms on path are most likely histo, but could also be blasto. Will obtain serum and urine histo antigen, and a chest x-ray, and fungal antibodies. If serum histo Ag is positive, we may follow this over time to tommy improvement with treatment.     Regarding therapy, would treat for histplasmosis with itraconazole. Start with 100mg tid for 3 days and then bid thereafter. Will follow up with patient in 6 weeks to assess for clinical progress, and recheck serum histo antigen.     2. History of recurrent fungal infections: Patient mentions history of fungal nail infections, possible esophageal candidiasis requiring longterm fluconazole. He also says he was told her had \"low t cells\". Today, we will obtain T cell subsets, and immunoglobulins to further evaluate this. .       Recommendations:  1. Start Itraconazole, 100 mg TID for 3 days, then 100 mg BID for 6 weeks  2. Check Histoplasma antigen in blood and urine, Beta glucan, Fungal antibodies  3. Obtain CXR  4. Obtain Immunoglobulins A/G/M, T cell subsets, and HIV testing  5. Stop Fluconazole    Plan of care discussed with Dr Samantha Alcala.     Carlos Bardales  PGY4 Infectious Diseases Fellow  Pager: [phone]      Attestation:  I have reviewed today's vital signs, medications, labs and imaging.  Floor time: 25 minutes, Face-to-face time: 10 minutes, Total time: " 35 minutes    Haseeb Neal was seen in the hospital by Samantha Alcala on 11/12/2018, with the fellow, Dr. Carlos Bardales MD. [facility] Infectious Diseases Fellow. I reviewed the history & exam. Assessment and plan were jointly made.  I agree with and have edited the fellow's note and plan of care.      Samantha Alcala MD.  ID Staff  [phone]           History of the Infectious Disease lllness:     Haseeb Neal is a 68 yo M who initially presented with anal pain and mucous discharge for 6 months, and was found to have an ulcer and a mass, with concern for an infection vs neoplasm. 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 He reports fungal infections of his nails for many years. He states that he had difficulty swallowing in the past, which was thought to be due to a fungal infection, and therefore he has been on Fluconazole 100 mg daily.     On 10/9/18 he had a biopsy of the anal mass, and pathology noted fungal organisms, compatible with histoplasmosis. He was referred to ID for follow up.       Review of Systems:  CONSTITUTIONAL:  No fevers or chills. No night sweats.  EYES: negative for icterus or acute vision changes.   ENT:  negative for hearing loss, tinnitus or sore throat  RESPIRATORY:  negative for cough, sputum, dyspnea  CARDIOVASCULAR:  negative for chest pain, heart palpitations  GASTROINTESTINAL:  negative for nausea, vomiting, diarrhea or constipation  GENITOURINARY:  negative for dysuria or hematuria.  HEME:  No easy bruising or bleeding  INTEGUMENT:  negative for rash or pruritus  NEURO:  Negative for headache or tremor.    Past Medical History:   Diagnosis Date     BPH (benign prostatic hyperplasia)      CAD (coronary artery disease)      COPD (chronic obstructive pulmonary disease) (H)      History of smoking      HTN (hypertension)      Hyperlipidemia      Old MI (myocardial infarction) 2006     PAD (peripheral artery disease) (H)      Presence  "of stent in coronary artery 2006     Past Surgical History:   Procedure Laterality Date     AAA REPAIR  04/2016    graft repair     APPENDECTOMY       GR II CORONARY STENT  2006     HERNIA REPAIR      x2     SIGMOIDOSCOPY FLEXIBLE N/A 10/9/2018    Procedure: SIGMOIDOSCOPY FLEXIBLE;  Exam Under Anesthesia, Flexible Sigmoidoscopy, Biopsies;  Surgeon: Radu Asher MD;  Location: [facility] OR     Family History   Problem Relation Age of Onset     Unknown/Adopted Mother      Emphysema Father      Family History Negative Sister      Social History     Social History Narrative     Social History   Substance Use Topics     Smoking status: Former Smoker     Packs/day: 1.50     Types: Cigarettes     Quit date: 04/2016     Smokeless tobacco: Never Used     Alcohol use 12.6 - 16.8 oz/week     21 - 28 Cans of beer per week     Outpatient Prescriptions Marked as Taking for the 11/9/18 encounter (Office Visit) with Carlos Veag MD   Medication Sig     albuterol (VENTOLIN HFA) 108 (90 Base) MCG/ACT inhaler Inhale 1 puff into the lungs every 4 hours as needed      atorvastatin (LIPITOR) 80 MG tablet Take 80 mg by mouth every morning      fluconazole (DIFLUCAN) 100 MG tablet Take 100 mg by mouth daily      fluticasone-vilanterol (BREO ELLIPTA) 100-25 MCG/INH inhaler Inhale 1 puff into the lungs every evening      INCRUSE ELLIPTA 62.5 MCG/INH Inhaler Inhale 1 puff into the lungs daily      itraconazole (SPORANOX) 100 MG capsule Take 1 capsule (100 mg) by mouth 2 times daily     metoprolol tartrate (LOPRESSOR) 25 MG tablet Take 25 mg by mouth every morning      tamsulosin (FLOMAX) 0.4 MG capsule Take 0.4 mg by mouth every morning      Allergies   Allergen Reactions     Penicillins Hives          Physical Exam:   Vitals were reviewed.  All vitals stable  BP 99/64  Pulse 87  Temp 98.4  F (36.9  C) (Oral)  Ht 1.778 m (5' 10\")  Wt 71.7 kg (158 lb)  SpO2 97%  BMI 22.67 kg/m2    Exam:  GENERAL:  well-developed, " well-nourished, alert, oriented, in no acute distress.  HEENT:  Head is normocephalic, atraumatic   EYES:  Eyes have anicteric sclerae.    ENT:  Oropharynx is moist without exudates or ulcers.  NECK:  Supple.  LUNGS:  Clear to auscultation.  CARDIOVASCULAR:  Regular rate and rhythm with no murmurs, gallops or rubs.  ABDOMEN:  Normal bowel sounds, soft, nontender.  SKIN: Irregular perianal ulceration about 2x 3 cm in size, 1 cm deep, clean, with no erythema, warmth, fluctuance, discharge. Yellowish discoloration and deformity of nails of left hand and both toes.   NEUROLOGIC:  Grossly nonfocal.         Laboratory Data:     Metabolic Studies    Recent Labs   Lab Test  09/17/18   1540   NA  138   POTASSIUM  3.7   CHLORIDE  104   CO2  27   ANIONGAP  6   BUN  14   CR  0.98   GFRESTIMATED  76   GLC  92   NATALIE  9.0     Hepatic Studies    Recent Labs   Lab Test  09/17/18   1540   BILITOTAL  0.7   ALKPHOS  160*   PROTTOTAL  8.3   ALBUMIN  3.2*   AST  76*   ALT  63     Hematology Studies     Recent Labs   Lab Test  09/17/18   1540   WBC  6.7   HGB  14.9   HCT  46.3   PLT  194     Iron Testing    Recent Labs   Lab Test  09/17/18   1540   MCV  91     Microbiology:  None    Imaging:  Results for orders placed or performed in visit on 09/28/18   MR Pelvis Musclular Tissue wo & w Contrast    Narrative    EXAMINATION: MR PELVIS MUSCULAR TISSUE WO & W CONTRAST,  9/28/2018 7:29 PM     COMPARISON: CT abdomen/pelvis without contrast 5/1/2016    TECHNIQUE:  Images were acquired without and with intravenous contrast  through the pelvis. The following MR images were acquired without  contrast: multiplanar T1, multiplanar T2, axial diffusion-weighted and  axial apparent diffusion coefficient. T1-weighted images with fat  saturation were acquired at the following intervals relative to  intravenous contrast administration: pre-contrast, immediate post  contrast, 1 minute, 3 minutes and delayed.  Contrast dose: 7.5mL  Gadavist, 0.5  glucogon    HISTORY: ; Rectal prolapse; Rectal ulcer; Anal pain    FINDINGS:    LOCATION/SIZE: In the right posterolateral anus extending from the  internal sphincter through the external sphincter to the anal verge,  there is a 4.3 cm mass with intermediate T2 signal intensity,  relatively homogeneous enhancement, and restricted diffusion (series 6  images 42-47).    EXTENT: The tumor involves the internal and external sphincters. No  involvement of the levator ani musculature.  Inferiorly, the mass  involves the right medial intergluteal cleft.    LYMPH NODES: No overtly suspicious pelvic lymphadenopathy. There are  scattered small pelvic lymph nodes, for example a 7 mm right iliac  chain lymph node (series 6 image 16) and a 3 mm lymph node in the  right posterior mesial rectal fat (series 6 image 23).     MISCELLANEOUS FINDINGS:  Multiple fluid-filled bladder diverticula. There is sigmoid  diverticulosis. Changes of aortobifemoral graft placement with patent  graft and occluded native iliac vessels.  In the posterior  subcutaneous tissues, in the coccyx there is a 1.2 x 0.7 cm ovoid  enhancing nodule (series 15 image 49). This has decreased T2 signal  suggesting prior infectious or traumatic etiology without findings to  suggest malignancy.         Impression    IMPRESSION:  4.3 cm mass in the right posterolateral anus extending  from the internal sphincter through the external sphincter to the  right gluteal fold. The mass also approaches the puborectalis  posteriorly in the midline. Findings are concerning for malignancy.    I have personally reviewed the examination and initial interpretation  and I agree with the findings.    CLARENCE HERNANDEZ MD       Pathology 10/9/18  FINAL DIAGNOSIS:   A. PERIANAL SKIN, RIGHT LATERAL, BIOPSY:   - Ulcerated perianal skin with adjacent pseudoepitheliomatous hyperplasia,    granuloma formation, marked chronic   inflammation   - Fungal organisms morphologically compatible  with histoplasmosis, see   comment   - Negative for malignancy     B. RIGHT ANAL MASS, BIOPSY:   - Scant fibroadipose tissue   - Negative for malignancy     COMMENT:   Preliminary diagnosis given to Dr. Radu Asher by Dr. Virginia Trevino    on 10/11/18 at 13:49.  GMS stain   confirms the presence of fungal organisms (yeast forms), PAS stain   highlights the organism as well.  Erin   stain is negative for AFB.       Carlos Vega MD

## 2018-11-09 NOTE — LETTER
"11/9/2018       RE: Haseeb Neal  [address]  [facility] [address]     Dear Colleague,    Thank you for referring your patient, Haseeb Neal, to the [facility] AND INFECTIOUS DISEASES at [facility]. Please see a copy of my visit note below.    [facility]   Infectious Disease Clinic Note:  New Patient     Patient:  Haseeb Neal, Date of birth 1950, Medical record number [id number]  Date of Visit:  11/09/2018  Consult requested by Dr. 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     1. Anal/rectal histoplasmosis: Differential for fungal organisms on path are most likely histo, but could also be blasto. Will obtain serum and urine histo antigen, and a chest x-ray, and fungal antibodies. If serum histo Ag is positive, we may follow this over time to tommy improvement with treatment.     Regarding therapy, would treat for histplasmosis with itraconazole. Start with 100mg tid for 3 days and then bid thereafter. Will follow up with patient in 6 weeks to assess for clinical progress, and recheck serum histo antigen.     2. History of recurrent fungal infections: Patient mentions history of fungal nail infections, possible esophageal candidiasis requiring longterm fluconazole. He also says he was told her had \"low t cells\". Today, we will obtain T cell subsets, and immunoglobulins to further evaluate this. .       Recommendations:  1. Start Itraconazole, 100 mg TID for 3 days, then 100 mg BID for 6 weeks  2. Check Histoplasma antigen in blood and urine, Beta glucan, Fungal antibodies  3. Obtain CXR  4. Obtain Immunoglobulins A/G/M, T cell subsets, and HIV testing  5. Stop Fluconazole    Plan of care discussed with Dr Singh " Fredrick.     Carlos Bardales  PGY4 Infectious Diseases Fellow  Pager: [phone]      Attestation:  I have reviewed today's vital signs, medications, labs and imaging.  Floor time: 25 minutes, Face-to-face time: 10 minutes, Total time: 35 minutes    Haseeb Neal was seen in the hospital by Samantha Alcala on 11/12/2018, with the fellow, Dr. Carlos Bardales MD. [facility] Infectious Diseases Fellow. I reviewed the history & exam. Assessment and plan were jointly made.  I agree with and have edited the fellow's note and plan of care.      Samantha Alcala MD.  ID Staff  [id number]           History of the Infectious Disease lllness:     Haseeb Neal is a 68 yo M who initially presented with anal pain and mucous discharge for 6 months, and was found to have an ulcer and a mass, with concern for an infection vs neoplasm. 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 He reports fungal infections of his nails for many years. He states that he had difficulty swallowing in the past, which was thought to be due to a fungal infection, and therefore he has been on Fluconazole 100 mg daily.     On 10/9/18 he had a biopsy of the anal mass, and pathology noted fungal organisms, compatible with histoplasmosis. He was referred to ID for follow up.       Review of Systems:  CONSTITUTIONAL:  No fevers or chills. No night sweats.  EYES: negative for icterus or acute vision changes.   ENT:  negative for hearing loss, tinnitus or sore throat  RESPIRATORY:  negative for cough, sputum, dyspnea  CARDIOVASCULAR:  negative for chest pain, heart palpitations  GASTROINTESTINAL:  negative for nausea, vomiting, diarrhea or constipation  GENITOURINARY:  negative for dysuria or hematuria.  HEME:  No easy bruising or bleeding  INTEGUMENT:  negative for rash or pruritus  NEURO:  Negative for headache or tremor.    Past Medical History:   Diagnosis Date     BPH (benign prostatic hyperplasia)      CAD  (coronary artery disease)      COPD (chronic obstructive pulmonary disease) (H)      History of smoking      HTN (hypertension)      Hyperlipidemia      Old MI (myocardial infarction) 2006     PAD (peripheral artery disease) (H)      Presence of stent in coronary artery 2006     Past Surgical History:   Procedure Laterality Date     AAA REPAIR  04/2016    graft repair     APPENDECTOMY       GR II CORONARY STENT  2006     HERNIA REPAIR      x2     SIGMOIDOSCOPY FLEXIBLE N/A 10/9/2018    Procedure: SIGMOIDOSCOPY FLEXIBLE;  Exam Under Anesthesia, Flexible Sigmoidoscopy, Biopsies;  Surgeon: Radu Asher MD;  Location:  OR     Family History   Problem Relation Age of Onset     Unknown/Adopted Mother      Emphysema Father      Family History Negative Sister      Social History     Social History Narrative     Social History   Substance Use Topics     Smoking status: Former Smoker     Packs/day: 1.50     Types: Cigarettes     Quit date: 04/2016     Smokeless tobacco: Never Used     Alcohol use 12.6 - 16.8 oz/week     21 - 28 Cans of beer per week     Outpatient Prescriptions Marked as Taking for the 11/9/18 encounter (Office Visit) with Carlos Vega MD   Medication Sig     albuterol (VENTOLIN HFA) 108 (90 Base) MCG/ACT inhaler Inhale 1 puff into the lungs every 4 hours as needed      atorvastatin (LIPITOR) 80 MG tablet Take 80 mg by mouth every morning      fluconazole (DIFLUCAN) 100 MG tablet Take 100 mg by mouth daily      fluticasone-vilanterol (BREO ELLIPTA) 100-25 MCG/INH inhaler Inhale 1 puff into the lungs every evening      INCRUSE ELLIPTA 62.5 MCG/INH Inhaler Inhale 1 puff into the lungs daily      itraconazole (SPORANOX) 100 MG capsule Take 1 capsule (100 mg) by mouth 2 times daily     metoprolol tartrate (LOPRESSOR) 25 MG tablet Take 25 mg by mouth every morning      tamsulosin (FLOMAX) 0.4 MG capsule Take 0.4 mg by mouth every morning      Allergies   Allergen Reactions     Penicillins  "Hives          Physical Exam:   Vitals were reviewed.  All vitals stable  BP 99/64  Pulse 87  Temp 98.4  F (36.9  C) (Oral)  Ht 1.778 m (5' 10\")  Wt 71.7 kg (158 lb)  SpO2 97%  BMI 22.67 kg/m2    Exam:  GENERAL:  well-developed, well-nourished, alert, oriented, in no acute distress.  HEENT:  Head is normocephalic, atraumatic   EYES:  Eyes have anicteric sclerae.    ENT:  Oropharynx is moist without exudates or ulcers.  NECK:  Supple.  LUNGS:  Clear to auscultation.  CARDIOVASCULAR:  Regular rate and rhythm with no murmurs, gallops or rubs.  ABDOMEN:  Normal bowel sounds, soft, nontender.  SKIN: Irregular perianal ulceration about 2x 3 cm in size, 1 cm deep, clean, with no erythema, warmth, fluctuance, discharge. Yellowish discoloration and deformity of nails of left hand and both toes.   NEUROLOGIC:  Grossly nonfocal.         Laboratory Data:     Metabolic Studies    Recent Labs   Lab Test  09/17/18   1540   NA  138   POTASSIUM  3.7   CHLORIDE  104   CO2  27   ANIONGAP  6   BUN  14   CR  0.98   GFRESTIMATED  76   GLC  92   NATALIE  9.0     Hepatic Studies    Recent Labs   Lab Test  09/17/18   1540   BILITOTAL  0.7   ALKPHOS  160*   PROTTOTAL  8.3   ALBUMIN  3.2*   AST  76*   ALT  63     Hematology Studies     Recent Labs   Lab Test  09/17/18   1540   WBC  6.7   HGB  14.9   HCT  46.3   PLT  194     Iron Testing    Recent Labs   Lab Test  09/17/18   1540   MCV  91     Microbiology:  None    Imaging:  Results for orders placed or performed in visit on 09/28/18   MR Pelvis Musclular Tissue wo & w Contrast    Narrative    EXAMINATION: MR PELVIS MUSCULAR TISSUE WO & W CONTRAST,  9/28/2018 7:29 PM     COMPARISON: CT abdomen/pelvis without contrast 5/1/2016    TECHNIQUE:  Images were acquired without and with intravenous contrast  through the pelvis. The following MR images were acquired without  contrast: multiplanar T1, multiplanar T2, axial diffusion-weighted and  axial apparent diffusion coefficient. T1-weighted " images with fat  saturation were acquired at the following intervals relative to  intravenous contrast administration: pre-contrast, immediate post  contrast, 1 minute, 3 minutes and delayed.  Contrast dose: 7.5mL  Gadavist, 0.5 glucogon    HISTORY: ; Rectal prolapse; Rectal ulcer; Anal pain    FINDINGS:    LOCATION/SIZE: In the right posterolateral anus extending from the  internal sphincter through the external sphincter to the anal verge,  there is a 4.3 cm mass with intermediate T2 signal intensity,  relatively homogeneous enhancement, and restricted diffusion (series 6  images 42-47).    EXTENT: The tumor involves the internal and external sphincters. No  involvement of the levator ani musculature.  Inferiorly, the mass  involves the right medial intergluteal cleft.    LYMPH NODES: No overtly suspicious pelvic lymphadenopathy. There are  scattered small pelvic lymph nodes, for example a 7 mm right iliac  chain lymph node (series 6 image 16) and a 3 mm lymph node in the  right posterior mesial rectal fat (series 6 image 23).     MISCELLANEOUS FINDINGS:  Multiple fluid-filled bladder diverticula. There is sigmoid  diverticulosis. Changes of aortobifemoral graft placement with patent  graft and occluded native iliac vessels.  In the posterior  subcutaneous tissues, in the coccyx there is a 1.2 x 0.7 cm ovoid  enhancing nodule (series 15 image 49). This has decreased T2 signal  suggesting prior infectious or traumatic etiology without findings to  suggest malignancy.         Impression    IMPRESSION:  4.3 cm mass in the right posterolateral anus extending  from the internal sphincter through the external sphincter to the  right gluteal fold. The mass also approaches the puborectalis  posteriorly in the midline. Findings are concerning for malignancy.    I have personally reviewed the examination and initial interpretation  and I agree with the findings.    CLARENCE HERNANDEZ MD       Pathology 10/9/18  FINAL  DIAGNOSIS:   A. PERIANAL SKIN, RIGHT LATERAL, BIOPSY:   - Ulcerated perianal skin with adjacent pseudoepitheliomatous hyperplasia,    granuloma formation, marked chronic   inflammation   - Fungal organisms morphologically compatible with histoplasmosis, see   comment   - Negative for malignancy     B. RIGHT ANAL MASS, BIOPSY:   - Scant fibroadipose tissue   - Negative for malignancy     COMMENT:   Preliminary diagnosis given to Dr. Radu Asher by Dr. Virginia Trevino    on 10/11/18 at 13:49.  GMS stain   confirms the presence of fungal organisms (yeast forms), PAS stain   highlights the organism as well.  Erin   stain is negative for AFB.       Carlos Vega MD

## 2018-11-09 NOTE — NURSING NOTE
Chief Complaint   Patient presents with     Consult     Rectal Histoplasmosis   Pt roomed, vitals, meds, and allergies reviewed with pt. Pt ready for provider.  Aguilar Leon, CMA

## 2018-11-09 NOTE — PROGRESS NOTES
"[facility]   Infectious Disease Clinic Note:  New Patient     Patient:  Haseeb Neal, Date of birth 1950, Medical record number [id number]  Date of Visit:  11/09/2018  Consult requested by Dr. 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     1. Anal/rectal histoplasmosis: Differential for fungal organisms on path are most likely histo, but could also be blasto. Will obtain serum and urine histo antigen, and a chest x-ray, and fungal antibodies. If serum histo Ag is positive, we may follow this over time to tommy improvement with treatment.     Regarding therapy, would treat for histplasmosis with itraconazole. Start with 100mg tid for 3 days and then bid thereafter. Will follow up with patient in 6 weeks to assess for clinical progress, and recheck serum histo antigen.     2. History of recurrent fungal infections: Patient mentions history of fungal nail infections, possible esophageal candidiasis requiring longterm fluconazole. He also says he was told her had \"low t cells\". Today, we will obtain T cell subsets, and immunoglobulins to further evaluate this. .       Recommendations:  1. Start Itraconazole, 100 mg TID for 3 days, then 100 mg BID for 6 weeks  2. Check Histoplasma antigen in blood and urine, Beta glucan, Fungal antibodies  3. Obtain CXR  4. Obtain Immunoglobulins A/G/M, T cell subsets, and HIV testing  5. Stop Fluconazole    Plan of care discussed with Dr Samantha Alcala.     Carlos Bardales  PGY4 Infectious Diseases Fellow  Pager: [phone]      Attestation:  I have reviewed today's vital signs, medications, labs and imaging.  Floor time: 25 minutes, Face-to-face time: 10 minutes, Total time: 35 minutes    Haseeb Neal was seen in the hospital by Samantha Alcala " on 11/12/2018, with the fellow, Dr. Carlos Bardales MD. [facility] Infectious Diseases Fellow. I reviewed the history & exam. Assessment and plan were jointly made.  I agree with and have edited the fellow's note and plan of care.      Samantha Alcala MD.  ID Staff  [id number]           History of the Infectious Disease lllness:     Haseeb Neal is a 68 yo M who initially presented with anal pain and mucous discharge for 6 months, and was found to have an ulcer and a mass, with concern for an infection vs neoplasm. 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 He reports fungal infections of his nails for many years. He states that he had difficulty swallowing in the past, which was thought to be due to a fungal infection, and therefore he has been on Fluconazole 100 mg daily.     On 10/9/18 he had a biopsy of the anal mass, and pathology noted fungal organisms, compatible with histoplasmosis. He was referred to ID for follow up.       Review of Systems:  CONSTITUTIONAL:  No fevers or chills. No night sweats.  EYES: negative for icterus or acute vision changes.   ENT:  negative for hearing loss, tinnitus or sore throat  RESPIRATORY:  negative for cough, sputum, dyspnea  CARDIOVASCULAR:  negative for chest pain, heart palpitations  GASTROINTESTINAL:  negative for nausea, vomiting, diarrhea or constipation  GENITOURINARY:  negative for dysuria or hematuria.  HEME:  No easy bruising or bleeding  INTEGUMENT:  negative for rash or pruritus  NEURO:  Negative for headache or tremor.    Past Medical History:   Diagnosis Date     BPH (benign prostatic hyperplasia)      CAD (coronary artery disease)      COPD (chronic obstructive pulmonary disease) (H)      History of smoking      HTN (hypertension)      Hyperlipidemia      Old MI (myocardial infarction) 2006     PAD (peripheral artery disease) (H)      Presence of stent in coronary artery 2006     Past Surgical History:   Procedure Laterality  "Date     AAA REPAIR  04/2016    graft repair     APPENDECTOMY       GR II CORONARY STENT  2006     HERNIA REPAIR      x2     SIGMOIDOSCOPY FLEXIBLE N/A 10/9/2018    Procedure: SIGMOIDOSCOPY FLEXIBLE;  Exam Under Anesthesia, Flexible Sigmoidoscopy, Biopsies;  Surgeon: Radu Asher MD;  Location:  OR     Family History   Problem Relation Age of Onset     Unknown/Adopted Mother      Emphysema Father      Family History Negative Sister      Social History     Social History Narrative     Social History   Substance Use Topics     Smoking status: Former Smoker     Packs/day: 1.50     Types: Cigarettes     Quit date: 04/2016     Smokeless tobacco: Never Used     Alcohol use 12.6 - 16.8 oz/week     21 - 28 Cans of beer per week     Outpatient Prescriptions Marked as Taking for the 11/9/18 encounter (Office Visit) with Carlos Vega MD   Medication Sig     albuterol (VENTOLIN HFA) 108 (90 Base) MCG/ACT inhaler Inhale 1 puff into the lungs every 4 hours as needed      atorvastatin (LIPITOR) 80 MG tablet Take 80 mg by mouth every morning      fluconazole (DIFLUCAN) 100 MG tablet Take 100 mg by mouth daily      fluticasone-vilanterol (BREO ELLIPTA) 100-25 MCG/INH inhaler Inhale 1 puff into the lungs every evening      INCRUSE ELLIPTA 62.5 MCG/INH Inhaler Inhale 1 puff into the lungs daily      itraconazole (SPORANOX) 100 MG capsule Take 1 capsule (100 mg) by mouth 2 times daily     metoprolol tartrate (LOPRESSOR) 25 MG tablet Take 25 mg by mouth every morning      tamsulosin (FLOMAX) 0.4 MG capsule Take 0.4 mg by mouth every morning      Allergies   Allergen Reactions     Penicillins Hives          Physical Exam:   Vitals were reviewed.  All vitals stable  BP 99/64  Pulse 87  Temp 98.4  F (36.9  C) (Oral)  Ht 1.778 m (5' 10\")  Wt 71.7 kg (158 lb)  SpO2 97%  BMI 22.67 kg/m2    Exam:  GENERAL:  well-developed, well-nourished, alert, oriented, in no acute distress.  HEENT:  Head is normocephalic, " atraumatic   EYES:  Eyes have anicteric sclerae.    ENT:  Oropharynx is moist without exudates or ulcers.  NECK:  Supple.  LUNGS:  Clear to auscultation.  CARDIOVASCULAR:  Regular rate and rhythm with no murmurs, gallops or rubs.  ABDOMEN:  Normal bowel sounds, soft, nontender.  SKIN: Irregular perianal ulceration about 2x 3 cm in size, 1 cm deep, clean, with no erythema, warmth, fluctuance, discharge. Yellowish discoloration and deformity of nails of left hand and both toes.   NEUROLOGIC:  Grossly nonfocal.         Laboratory Data:     Metabolic Studies    Recent Labs   Lab Test  09/17/18   1540   NA  138   POTASSIUM  3.7   CHLORIDE  104   CO2  27   ANIONGAP  6   BUN  14   CR  0.98   GFRESTIMATED  76   GLC  92   NATALIE  9.0     Hepatic Studies    Recent Labs   Lab Test  09/17/18   1540   BILITOTAL  0.7   ALKPHOS  160*   PROTTOTAL  8.3   ALBUMIN  3.2*   AST  76*   ALT  63     Hematology Studies     Recent Labs   Lab Test  09/17/18   1540   WBC  6.7   HGB  14.9   HCT  46.3   PLT  194     Iron Testing    Recent Labs   Lab Test  09/17/18   1540   MCV  91     Microbiology:  None    Imaging:  Results for orders placed or performed in visit on 09/28/18   MR Pelvis Musclular Tissue wo & w Contrast    Narrative    EXAMINATION: MR PELVIS MUSCULAR TISSUE WO & W CONTRAST,  9/28/2018 7:29 PM     COMPARISON: CT abdomen/pelvis without contrast 5/1/2016    TECHNIQUE:  Images were acquired without and with intravenous contrast  through the pelvis. The following MR images were acquired without  contrast: multiplanar T1, multiplanar T2, axial diffusion-weighted and  axial apparent diffusion coefficient. T1-weighted images with fat  saturation were acquired at the following intervals relative to  intravenous contrast administration: pre-contrast, immediate post  contrast, 1 minute, 3 minutes and delayed.  Contrast dose: 7.5mL  Gadavist, 0.5 glucogon    HISTORY: ; Rectal prolapse; Rectal ulcer; Anal pain    FINDINGS:    LOCATION/SIZE: In  the right posterolateral anus extending from the  internal sphincter through the external sphincter to the anal verge,  there is a 4.3 cm mass with intermediate T2 signal intensity,  relatively homogeneous enhancement, and restricted diffusion (series 6  images 42-47).    EXTENT: The tumor involves the internal and external sphincters. No  involvement of the levator ani musculature.  Inferiorly, the mass  involves the right medial intergluteal cleft.    LYMPH NODES: No overtly suspicious pelvic lymphadenopathy. There are  scattered small pelvic lymph nodes, for example a 7 mm right iliac  chain lymph node (series 6 image 16) and a 3 mm lymph node in the  right posterior mesial rectal fat (series 6 image 23).     MISCELLANEOUS FINDINGS:  Multiple fluid-filled bladder diverticula. There is sigmoid  diverticulosis. Changes of aortobifemoral graft placement with patent  graft and occluded native iliac vessels.  In the posterior  subcutaneous tissues, in the coccyx there is a 1.2 x 0.7 cm ovoid  enhancing nodule (series 15 image 49). This has decreased T2 signal  suggesting prior infectious or traumatic etiology without findings to  suggest malignancy.         Impression    IMPRESSION:  4.3 cm mass in the right posterolateral anus extending  from the internal sphincter through the external sphincter to the  right gluteal fold. The mass also approaches the puborectalis  posteriorly in the midline. Findings are concerning for malignancy.    I have personally reviewed the examination and initial interpretation  and I agree with the findings.    CLARENCE HERNANDEZ MD       Pathology 10/9/18  FINAL DIAGNOSIS:   A. PERIANAL SKIN, RIGHT LATERAL, BIOPSY:   - Ulcerated perianal skin with adjacent pseudoepitheliomatous hyperplasia,    granuloma formation, marked chronic   inflammation   - Fungal organisms morphologically compatible with histoplasmosis, see   comment   - Negative for malignancy     B. RIGHT ANAL MASS, BIOPSY:   -  Scant fibroadipose tissue   - Negative for malignancy     COMMENT:   Preliminary diagnosis given to Dr. Radu Asher by Dr. Virginia Trevino    on 10/11/18 at 13:49.  GMS stain   confirms the presence of fungal organisms (yeast forms), PAS stain   highlights the organism as well.  Erin   stain is negative for AFB.

## 2018-11-09 NOTE — MR AVS SNAPSHOT
After Visit Summary   11/9/2018    Haseeb Neal    MRN: [id number]           Patient Information     Date Of Birth          1950        Visit Information        Provider Department      11/9/2018 2:00 PM Carlos Vega MD [facility] and Infectious Diseases        Today's Diagnoses     Histoplasmosis    -  1    Anal pain           Follow-ups after your visit        Follow-up notes from your care team     Return in about 6 weeks (around 12/21/2018).      Your next 10 appointments already scheduled     Dec 21, 2018 12:30 PM CST   (Arrive by 12:15 PM)   Return Visit with Carlos Vega MD   [facility] Infectious Diseases ([facility])    [address] [place] [address]  [address] [place] [id number] [phone]              Future tests that were ordered for you today     Open Future Orders        Priority Expected Expires Ordered    T cell subset profile Routine  11/9/2019 11/9/2018    Immunoglobulins A G and M Routine  11/9/2019 11/9/2018    1,3 Beta D glucan fungitell Routine  11/9/2019 11/9/2018    Fungal Antibodies Routine  11/9/2019 11/9/2018    HIV Antigen Antibody Combo Routine  11/9/2019 11/9/2018    Histoplasma Capsulatum Antigen (Non Blood) Routine  11/9/2019 11/9/2018    Histoplasma capsulatum antigen (Blood Only) Routine  11/9/2019 11/9/2018    X-ray Chest 2 vws* Routine 11/9/2018 11/9/2019 11/9/2018            Who to contact     If you have questions or need follow up information about today's clinic visit or your schedule please contact [facility] AND INFECTIOUS DISEASES directly at [phone].  Normal or non-critical lab and imaging results will be communicated to you by MyChart, letter or phone within 4 business days after the clinic has received the results. If you do not hear from us within 7 days, please contact the clinic through MyChart or phone. If you have a critical or abnormal lab  "result, we will notify you by phone as soon as possible.  Submit refill requests through [email or web address] or call your pharmacy and they will forward the refill request to us. Please allow 3 business days for your refill to be completed.          Additional Information About Your Visit        Care EveryWhere ID     This is your Care EveryWhere ID. This could be used by other organizations to access your [place] medical records  [id number]        Your Vitals Were     Pulse Temperature Height Pulse Oximetry BMI (Body Mass Index)       87 98.4  F (36.9  C) (Oral) 1.778 m (5' 10\") 97% 22.67 kg/m2        Blood Pressure from Last 3 Encounters:   11/09/18 99/64   10/09/18 118/70   09/28/18 112/74    Weight from Last 3 Encounters:   11/09/18 71.7 kg (158 lb)   10/09/18 69.6 kg (153 lb 7 oz)   09/28/18 71.6 kg (157 lb 11.8 oz)                 Today's Medication Changes          These changes are accurate as of 11/9/18  4:00 PM.  If you have any questions, ask your nurse or doctor.               Start taking these medicines.        Dose/Directions    itraconazole 100 MG capsule   Commonly known as:  SPORANOX   Used for:  Histoplasmosis   Started by:  Carlos Vega MD        Dose:  100 mg   Take 1 capsule (100 mg) by mouth 2 times daily   Quantity:  90 capsule   Refills:  1            Where to get your medicines      These medications were sent to [facility]. [address], [facility] [address]     Phone:  [phone]     itraconazole 100 MG capsule                Primary Care Provider Office Phone # Fax #    Jimy Hill [phone] [address] [facility] [address]        Equal Access to Services     [facility] : Chris Chopra, waaxda luqadaha, qaybta kaalmada quinn, edison nicholas. So [facility] [phone].    ATENCIÓN: Si habla español, tiene a rogers disposición " servicios gratuitos de asistencia lingüística. Martha castellanos [phone].    We comply with applicable federal civil rights laws and [place] laws. We do not discriminate on the basis of race, color, national origin, age, disability, sex, sexual orientation, or gender identity.            Thank you!     Thank you for choosing [facility] AND INFECTIOUS DISEASES  for your care. Our goal is always to provide you with excellent care. Hearing back from our patients is one way we can continue to improve our services. Please take a few minutes to complete the written survey that you may receive in the mail after your visit with us. Thank you!             Your Updated Medication List - Protect others around you: Learn how to safely use, store and throw away your medicines at www.disposemymeds.org.          This list is accurate as of 11/9/18  4:00 PM.  Always use your most recent med list.                   Brand Name Dispense Instructions for use Diagnosis    atorvastatin 80 MG tablet    LIPITOR     Take 80 mg by mouth every morning        fluconazole 100 MG tablet    DIFLUCAN     Take 100 mg by mouth daily        fluticasone-vilanterol 100-25 MCG/INH inhaler    BREO ELLIPTA     Inhale 1 puff into the lungs every evening        INCRUSE ELLIPTA 62.5 MCG/INH inhaler   Generic drug:  umeclidinium      Inhale 1 puff into the lungs daily        itraconazole 100 MG capsule    SPORANOX    90 capsule    Take 1 capsule (100 mg) by mouth 2 times daily    Histoplasmosis       metoprolol tartrate 25 MG tablet    LOPRESSOR     Take 25 mg by mouth every morning        tamsulosin 0.4 MG capsule    FLOMAX     Take 0.4 mg by mouth every morning        VENTOLIN  (90 Base) MCG/ACT inhaler   Generic drug:  albuterol      Inhale 1 puff into the lungs every 4 hours as needed

## 2018-11-10 LAB
CD3 CELLS # BLD: 1673 CELLS/UL (ref 603–2990)
CD3 CELLS NFR BLD: 82 % (ref 49–84)
CD3+CD4+ CELLS # BLD: 815 CELLS/UL (ref 441–2156)
CD3+CD4+ CELLS NFR BLD: 40 % (ref 28–63)
CD3+CD4+ CELLS/CD3+CD8+ CLL BLD: 1.11 % (ref 1.4–2.6)
CD3+CD8+ CELLS # BLD: 740 CELLS/UL (ref 125–1312)
CD3+CD8+ CELLS NFR BLD: 36 % (ref 10–40)
IFC SPECIMEN: ABNORMAL

## 2018-11-12 ENCOUNTER — TELEPHONE (OUTPATIENT)
Dept: INFECTIOUS DISEASES | Facility: CLINIC | Age: 68
End: 2018-11-12

## 2018-11-12 LAB
HIV 1+2 AB+HIV1 P24 AG SERPL QL IA: NONREACTIVE
IGA SERPL-MCNC: 644 MG/DL (ref 70–380)
IGG SERPL-MCNC: 1630 MG/DL (ref 695–1620)
IGM SERPL-MCNC: 116 MG/DL (ref 60–265)

## 2018-11-12 NOTE — TELEPHONE ENCOUNTER
"Prior Authorization Retail Medication Request    Medication/Dose: itraconazole (SPORANOX) 100 MG capsule  ICD code (if different than what is on RX):    Previously Tried and Failed:    Rationale:  \"68 y/o M being referred for anal histoplasma found on biopsy after presenting with an ulcer and a perianal mass. Will begin treatment with Itraconazole for 6 weeks, to lengthen further depending on clinical progress. Start Itraconazole, 100 mg TID for 3 days, then 100 mg BID for 6 weeks.\"    Insurance Name:  [employer] [id number], [employer] for HB supplement  Insurance ID:        Pharmacy Information (if different than what is on RX)  Name:    Phone:      "

## 2018-11-12 NOTE — TELEPHONE ENCOUNTER
Prior Authorization Approval    Authorization Effective Date: 8/14/2018  Authorization Expiration Date: 5/11/2019  Medication: itraconazole (SPORANOX) 100 MG capsule-APPROVED  Approved Dose/Quantity:   Reference #:     Insurance Company: [employer]ia [employer] - Phone [phone] Fax [phone]  Expected CoPay:       CoPay Card Available:      Foundation Assistance Needed:    Which Pharmacy is filling the prescription (Not needed for infusion/clinic administered): [facility]. [address]  Pharmacy Notified: Yes  Patient Notified: Yes

## 2018-11-12 NOTE — TELEPHONE ENCOUNTER
Central Prior Authorization Team   [phone]    PA Initiation    Medication: itraconazole (SPORANOX) 100 MG capsule  Insurance Company: [email or web address] - Phone [phone] Fax [phone]  Pharmacy Filling the Rx: [facility]. [address]  Filling Pharmacy Phone: [phone]  Filling Pharmacy Fax:    Start Date: 11/12/2018        Initiated PA via faxed to [phone].

## 2018-11-13 LAB
1,3 BETA GLUCAN SER-MCNC: 123 PG/ML
B-D GLUCAN INTERPRETATION (1,3): POSITIVE

## 2018-11-14 LAB
LAB SCANNED RESULT: ABNORMAL
LAB SCANNED RESULT: ABNORMAL

## 2018-11-15 LAB
ASPERGILLUS AB TITR SER CF: ABNORMAL {TITER}
B DERMAT AB SER-ACNC: 1.8 IV
COCCIDIOIDES AB TITR SER CF: ABNORMAL {TITER}
H CAPSUL MYC AB TITR SER CF: ABNORMAL {TITER}
H CAPSUL YST AB TITR SER CF: ABNORMAL {TITER}

## 2018-11-18 LAB — B DERMAT AB SER QL ID: NORMAL

## 2019-01-11 ENCOUNTER — OFFICE VISIT (OUTPATIENT)
Dept: INFECTIOUS DISEASES | Facility: CLINIC | Age: 69
End: 2019-01-11
Payer: MEDICARE

## 2019-01-11 VITALS
OXYGEN SATURATION: 96 % | SYSTOLIC BLOOD PRESSURE: 107 MMHG | TEMPERATURE: 98.5 F | DIASTOLIC BLOOD PRESSURE: 71 MMHG | WEIGHT: 164.2 LBS | BODY MASS INDEX: 23.56 KG/M2 | HEART RATE: 60 BPM

## 2019-01-11 DIAGNOSIS — B35.1 ONYCHOMYCOSIS: ICD-10-CM

## 2019-01-11 DIAGNOSIS — B39.9 HISTOPLASMOSIS: Primary | ICD-10-CM

## 2019-01-11 DIAGNOSIS — B39.9 HISTOPLASMOSIS: ICD-10-CM

## 2019-01-11 LAB
ALBUMIN SERPL-MCNC: 3.3 G/DL (ref 3.4–5)
ALP SERPL-CCNC: 131 U/L (ref 40–150)
ALT SERPL W P-5'-P-CCNC: 44 U/L (ref 0–70)
AST SERPL W P-5'-P-CCNC: 55 U/L (ref 0–45)
BILIRUB DIRECT SERPL-MCNC: 0.2 MG/DL (ref 0–0.2)
BILIRUB SERPL-MCNC: 0.6 MG/DL (ref 0.2–1.3)
PROT SERPL-MCNC: 7.4 G/DL (ref 6.8–8.8)

## 2019-01-11 PROCEDURE — 87449 NOS EACH ORGANISM AG IA: CPT | Performed by: STUDENT IN AN ORGANIZED HEALTH CARE EDUCATION/TRAINING PROGRAM

## 2019-01-11 PROCEDURE — 87385 HISTOPLASMA CAPSUL AG IA: CPT | Performed by: STUDENT IN AN ORGANIZED HEALTH CARE EDUCATION/TRAINING PROGRAM

## 2019-01-11 PROCEDURE — 80076 HEPATIC FUNCTION PANEL: CPT | Performed by: STUDENT IN AN ORGANIZED HEALTH CARE EDUCATION/TRAINING PROGRAM

## 2019-01-11 PROCEDURE — G0463 HOSPITAL OUTPT CLINIC VISIT: HCPCS | Mod: ZF

## 2019-01-11 PROCEDURE — 36415 COLL VENOUS BLD VENIPUNCTURE: CPT | Performed by: STUDENT IN AN ORGANIZED HEALTH CARE EDUCATION/TRAINING PROGRAM

## 2019-01-11 RX ORDER — ITRACONAZOLE 100 MG/1
100 CAPSULE ORAL 2 TIMES DAILY
Qty: 180 CAPSULE | Refills: 0 | Status: SHIPPED | OUTPATIENT
Start: 2019-01-11 | End: 2019-08-07

## 2019-01-11 RX ORDER — TERBINAFINE HYDROCHLORIDE 250 MG/1
250 TABLET ORAL DAILY
Qty: 42 TABLET | Refills: 1 | Status: SHIPPED | OUTPATIENT
Start: 2019-01-11 | End: 2019-04-10

## 2019-01-11 ASSESSMENT — PAIN SCALES - GENERAL: PAINLEVEL: NO PAIN (0)

## 2019-01-11 NOTE — NURSING NOTE
Chief Complaint   Patient presents with     RECHECK     Histoplasmosis     /71   Pulse 60   Temp 98.5  F (36.9  C) (Oral)   Wt 74.5 kg (164 lb 3.2 oz)   SpO2 96%   BMI 23.56 kg/m    Debora Izquierdo MA

## 2019-01-12 NOTE — PROGRESS NOTES
"[facility]  Infectious Disease Clinic Note:  Follow up     Patient:  Haseeb Neal, Date of birth 1950, Medical record number [id number]  Date of Visit:  01/11/2019  Consult requested by Dr. 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 Continue Itraconazole 100 mg BID for 3 months.  2. Follow up in 3 months and reevaluate  3. Repeat serum histoplasma and blastomyces antigens  4. Repeat a CXR  5. Start Terbinafine 250 mg daily for onychomycosis for 12 weeks  6. Check LFTs monthly while on antifungals. Fax number provided on standing order for local clinic to fax over LFTs  7. Will plan to repeat T Cell subsets in the future    Assessment:   Haseeb Neal is a 66 yo M here for f/u of anal histoplasma found on pathology after presenting with an ulcer and a perianal mass.     1. Anal/rectal histoplasmosis: Differential for fungal organisms on path are most likely histo, but could also be blasto. Will check serum histo Ag to tommy improvement with treatment. Positive Blastomyces antibodies seen on Fungal serology panel. Histoplasma and Blastomyces assays are sometimes mistaken for each other. Will check Blastomyces antigen. Will get a CXR in light of chest symptoms.    2. History of recurrent fungal infections: Patient mentions history of fungal nail infections, possible esophageal candidiasis requiring longterm fluconazole. He also says he was told he had \"low t cells\" in the past. T cell subsets checked at last visit showing only low CD4/CD8 ratio, otherwise normal. Will plan to repeat in the future. HIV and and immunoglobulins checked at last visit and found to be normal.    3. Onychomycosis: No improvement on itraconazole. Will start terbinafine, and monitor LFTs monthly.    Plan of care discussed with Staff ID Physician Dr Ricco Goodwin.     Carlos Bardales  PGY4 Infectious Diseases Fellow  Pager: [phone]     "    History of the Infectious Disease lllness:     Haseeb Neal is a 66 yo M who initially presented with anal pain and mucous discharge for 6 months, and was found to have an ulcer and a mass, with concern for an infection vs neoplasm. On 10/9/18 he had a biopsy of the anal mass, and pathology noted fungal organisms, compatible with histoplasmosis. He was referred to ID for follow up. We started him on Itraconazole on 11/09, ordered urine and serum histoplasma and blastomyces antigens, a CXR, and immunological workup including HIV, T cell subsets, immunoglobulins. T cell subsets showed only low CD4/CD8 ratio, otherwise normal. HIV and and immunoglobulins found to be normal. BD Glucan was positive at 123.    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 He reported fungal infections of his nails for many years. He had difficulty swallowing in the past, which was thought to be due to a fungal infection, and therefore he had been on Fluconazole 100 mg daily, which was discontinued at last visit when itraconazole was started.     Today, he returns for follow up. Reports compliance with his itraconazole mostly, except for 4 days in between when he ran out of his meds. His anal ulcer pain has improved, and the ulcer has reduced in size. He still notes some discharge, although a reduced amount. At last visit he did not have any chest symptoms. CXR done then showed no acute findings, but hyperattenuating perihilar nodularity, that may represent calcified  granulomas. Since then he has had a cough with clear sputum that has been persistent.    Review of Systems:  CONSTITUTIONAL:  No fevers or chills. No night sweats.  EYES: negative for icterus or acute vision changes.   ENT:  negative for hearing loss, tinnitus or sore throat  RESPIRATORY:  negative for dyspnea  CARDIOVASCULAR:  negative for chest pain, heart palpitations  GASTROINTESTINAL:  negative for nausea, vomiting, diarrhea or  constipation  GENITOURINARY:  negative for dysuria or hematuria.  HEME:  No easy bruising or bleeding  INTEGUMENT:  negative for rash or pruritus  NEURO:  Negative for headache or tremor.    Past Medical History:   Diagnosis Date     BPH (benign prostatic hyperplasia)      CAD (coronary artery disease)      COPD (chronic obstructive pulmonary disease) (H)      History of smoking      HTN (hypertension)      Hyperlipidemia      Old MI (myocardial infarction)      PAD (peripheral artery disease) (H)      Presence of stent in coronary artery      Past Surgical History:   Procedure Laterality Date     AAA REPAIR  2016    graft repair     APPENDECTOMY       GR II CORONARY STENT  2006     HERNIA REPAIR      x2     SIGMOIDOSCOPY FLEXIBLE N/A 10/9/2018    Procedure: SIGMOIDOSCOPY FLEXIBLE;  Exam Under Anesthesia, Flexible Sigmoidoscopy, Biopsies;  Surgeon: Radu Asher MD;  Location:  OR     Family History   Problem Relation Age of Onset     Unknown/Adopted Mother      Emphysema Father      Family History Negative Sister      Social History     Social History Narrative    Lives in [facility]. 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, [employer]     Social History     Tobacco Use     Smoking status: Former Smoker     Packs/day: 1.50     Types: Cigarettes     Last attempt to quit: 2016     Years since quittin.7     Smokeless tobacco: Never Used   Substance Use Topics     Alcohol use: Yes     Alcohol/week: 12.6 - 16.8 oz     Types: 21 - 28 Cans of beer per week     Drug use: No     Outpatient Medications Marked as Taking for the 19 encounter (Office Visit) with Carlos Vega MD   Medication Sig     albuterol (VENTOLIN HFA) 108 (90 Base) MCG/ACT inhaler Inhale 1 puff into the lungs every 4 hours as needed      atorvastatin (LIPITOR) 80 MG tablet Take 80 mg by mouth every morning      fluticasone-vilanterol (BREO ELLIPTA) 100-25 MCG/INH inhaler Inhale 1 puff into the lungs every evening       INCRUSE ELLIPTA 62.5 MCG/INH Inhaler Inhale 1 puff into the lungs daily      itraconazole (SPORANOX) 100 MG capsule Take 1 capsule (100 mg) by mouth 2 times daily     itraconazole (SPORANOX) 100 MG capsule Take 1 capsule (100 mg) by mouth 2 times daily     metoprolol tartrate (LOPRESSOR) 25 MG tablet Take 25 mg by mouth every morning      tamsulosin (FLOMAX) 0.4 MG capsule Take 0.4 mg by mouth every morning      terbinafine (LAMISIL) 250 MG tablet Take 1 tablet (250 mg) by mouth daily     Allergies   Allergen Reactions     Penicillins Hives          Physical Exam:   Vitals were reviewed.  All vitals stable  /71   Pulse 60   Temp 98.5  F (36.9  C) (Oral)   Wt 74.5 kg (164 lb 3.2 oz)   SpO2 96%   BMI 23.56 kg/m      Exam:  GENERAL:  well-developed, well-nourished, alert, oriented, in no acute distress.  HEENT:  Head is normocephalic, atraumatic   EYES:  Eyes have anicteric sclerae.    ENT:  Oropharynx is moist without exudates or ulcers.  NECK:  Supple.  LUNGS:  Clear to auscultation.  CARDIOVASCULAR:  Regular rate and rhythm with no murmurs, gallops or rubs.  ABDOMEN:  Normal bowel sounds, soft, nontender.  SKIN: Irregular perianal ulceration about 1x1 cm in size, 1 mm depth, smaller compared to before, clean, with no erythema, warmth, fluctuance, discharge. Yellowish discoloration and deformity of nails of left hand and both toes.   NEUROLOGIC:  Grossly nonfocal.         Laboratory Data:     Metabolic Studies    Recent Labs   Lab Test 09/17/18  1540      POTASSIUM 3.7   CHLORIDE 104   CO2 27   ANIONGAP 6   BUN 14   CR 0.98   GFRESTIMATED 76   GLC 92   NATALIE 9.0     Hepatic Studies    Recent Labs   Lab Test 01/11/19  1324 09/17/18  1540   BILITOTAL 0.6 0.7   DBIL 0.2  --    ALKPHOS 131 160*   PROTTOTAL 7.4 8.3   ALBUMIN 3.3* 3.2*   AST 55* 76*   ALT 44 63     Hematology Studies     Recent Labs   Lab Test 09/17/18  1540   WBC 6.7   HGB 14.9   HCT 46.3        Iron Testing    Recent Labs   Lab Test  09/17/18  1540   MCV 91     Microbiology:  11/9/2018:  1,3 BD Glucan 123  Fungal antibodies: Aspergillus negative, Blastomyces positive, Histoplasma, Coccidiomyces negative  HIV: NR   Urine histoplasma antigen - 0.5  Blood histoplasma - 0.83  IgG 1630  IgA 644  IgM 116    T cell subsets:   Component Value Flag Ref Range Units Status Collected Lab   IFC Specimen Blood     Final 11/09/2018  4:21 PM 51   CD3 Mature T 82   49 - 84 % Final 11/09/2018  4:21 PM 51   CD4 Sophia T 40   28 - 63 % Final 11/09/2018  4:21 PM 51   CD8 Suppressor T 36   10 - 40 % Final 11/09/2018  4:21 PM 51   CD4:CD8 Ratio 1.11 Abnormally low   L  1.40 - 2.60  Final 11/09/2018  4:21 PM 51   Absolute CD3 1,673   603 - 2,990 cells/uL Final 11/09/2018  4:21 PM 51   Absolute CD4 815   441 - 2,156 cells/uL Final 11/09/2018  4:21 PM 51   Absolute CD8 740   125 - 1,312 cells/uL Final 11/09/2018  4:21 PM 5     Imaging:  Results for orders placed or performed in visit on 09/28/18   MR Pelvis Musclular Tissue wo & w Contrast    Narrative    EXAMINATION: MR PELVIS MUSCULAR TISSUE WO & W CONTRAST,  9/28/2018 7:29 PM     COMPARISON: CT abdomen/pelvis without contrast 5/1/2016    TECHNIQUE:  Images were acquired without and with intravenous contrast  through the pelvis. The following MR images were acquired without  contrast: multiplanar T1, multiplanar T2, axial diffusion-weighted and  axial apparent diffusion coefficient. T1-weighted images with fat  saturation were acquired at the following intervals relative to  intravenous contrast administration: pre-contrast, immediate post  contrast, 1 minute, 3 minutes and delayed.  Contrast dose: 7.5mL  Gadavist, 0.5 glucogon    HISTORY: ; Rectal prolapse; Rectal ulcer; Anal pain    FINDINGS:    LOCATION/SIZE: In the right posterolateral anus extending from the  internal sphincter through the external sphincter to the anal verge,  there is a 4.3 cm mass with intermediate T2 signal intensity,  relatively homogeneous  enhancement, and restricted diffusion (series 6  images 42-47).    EXTENT: The tumor involves the internal and external sphincters. No  involvement of the levator ani musculature.  Inferiorly, the mass  involves the right medial intergluteal cleft.    LYMPH NODES: No overtly suspicious pelvic lymphadenopathy. There are  scattered small pelvic lymph nodes, for example a 7 mm right iliac  chain lymph node (series 6 image 16) and a 3 mm lymph node in the  right posterior mesial rectal fat (series 6 image 23).     MISCELLANEOUS FINDINGS:  Multiple fluid-filled bladder diverticula. There is sigmoid  diverticulosis. Changes of aortobifemoral graft placement with patent  graft and occluded native iliac vessels.  In the posterior  subcutaneous tissues, in the coccyx there is a 1.2 x 0.7 cm ovoid  enhancing nodule (series 15 image 49). This has decreased T2 signal  suggesting prior infectious or traumatic etiology without findings to  suggest malignancy.         Impression    IMPRESSION:  4.3 cm mass in the right posterolateral anus extending  from the internal sphincter through the external sphincter to the  right gluteal fold. The mass also approaches the puborectalis  posteriorly in the midline. Findings are concerning for malignancy.    I have personally reviewed the examination and initial interpretation  and I agree with the findings.    CLARENCE HERNANDEZ MD     CXR 11/9:    IMPRESSION:   1.  Hyperattenuating perihilar nodularity, may represent calcified  granulomas.  2.  No acute airspace disease.    Pathology 10/9/18  FINAL DIAGNOSIS:   A. PERIANAL SKIN, RIGHT LATERAL, BIOPSY:   - Ulcerated perianal skin with adjacent pseudoepitheliomatous hyperplasia,    granuloma formation, marked chronic   inflammation   - Fungal organisms morphologically compatible with histoplasmosis, see   comment   - Negative for malignancy     B. RIGHT ANAL MASS, BIOPSY:   - Scant fibroadipose tissue   - Negative for malignancy     COMMENT:    Preliminary diagnosis given to Dr. Radu Asher by Dr. Virginia Trevino    on 10/11/18 at 13:49.  GMS stain   confirms the presence of fungal organisms (yeast forms), PAS stain   highlights the organism as well.  Erin stain is negative for AFB.

## 2019-01-14 PROBLEM — B39.9 HISTOPLASMOSIS: Status: ACTIVE | Noted: 2019-01-14

## 2019-01-14 PROBLEM — B39.9 HISTOPLASMOSIS: Status: ACTIVE | Noted: 2019-01-11

## 2019-01-15 NOTE — PROGRESS NOTES
Infectious Disease Clinic Staff Note: Mr. Neal was seen, examined, and the case was discussed with Dr. Vega, ID Fellow -- I agree with her interval history and examination, assessment and plan in this outpatient ID Progress note. This note reflects my observations and opinions and the plan outlined fully reflects my approach. I have reviewed the available history, radiology, laboratory results, and reports with the Fellow.

## 2019-01-16 LAB — LAB SCANNED RESULT: ABNORMAL

## 2019-01-17 LAB — LAB SCANNED RESULT: ABNORMAL

## 2019-03-07 ENCOUNTER — TRANSFERRED RECORDS (OUTPATIENT)
Dept: HEALTH INFORMATION MANAGEMENT | Facility: CLINIC | Age: 69
End: 2019-03-07

## 2019-04-08 ENCOUNTER — TELEPHONE (OUTPATIENT)
Dept: INFECTIOUS DISEASES | Facility: CLINIC | Age: 69
End: 2019-04-08

## 2019-04-10 ENCOUNTER — OFFICE VISIT (OUTPATIENT)
Dept: INFECTIOUS DISEASES | Facility: CLINIC | Age: 69
End: 2019-04-10
Attending: INTERNAL MEDICINE
Payer: MEDICARE

## 2019-04-10 VITALS
SYSTOLIC BLOOD PRESSURE: 112 MMHG | TEMPERATURE: 98.4 F | HEART RATE: 66 BPM | WEIGHT: 162.9 LBS | BODY MASS INDEX: 23.32 KG/M2 | HEIGHT: 70 IN | DIASTOLIC BLOOD PRESSURE: 62 MMHG

## 2019-04-10 DIAGNOSIS — R05.3 CHRONIC COUGH: ICD-10-CM

## 2019-04-10 DIAGNOSIS — B35.1 ONYCHOMYCOSIS: ICD-10-CM

## 2019-04-10 DIAGNOSIS — J44.9 CHRONIC OBSTRUCTIVE PULMONARY DISEASE, UNSPECIFIED COPD TYPE (H): ICD-10-CM

## 2019-04-10 DIAGNOSIS — B39.9 HISTOPLASMOSIS: ICD-10-CM

## 2019-04-10 DIAGNOSIS — B39.9 HISTOPLASMOSIS: Primary | ICD-10-CM

## 2019-04-10 LAB
ALBUMIN SERPL-MCNC: 3.1 G/DL (ref 3.4–5)
ALP SERPL-CCNC: 109 U/L (ref 40–150)
ALT SERPL W P-5'-P-CCNC: 34 U/L (ref 0–70)
AST SERPL W P-5'-P-CCNC: 41 U/L (ref 0–45)
BILIRUB DIRECT SERPL-MCNC: 0.2 MG/DL (ref 0–0.2)
BILIRUB SERPL-MCNC: 0.6 MG/DL (ref 0.2–1.3)
PROT SERPL-MCNC: 6.7 G/DL (ref 6.8–8.8)

## 2019-04-10 PROCEDURE — 80076 HEPATIC FUNCTION PANEL: CPT | Performed by: STUDENT IN AN ORGANIZED HEALTH CARE EDUCATION/TRAINING PROGRAM

## 2019-04-10 PROCEDURE — 36415 COLL VENOUS BLD VENIPUNCTURE: CPT | Performed by: STUDENT IN AN ORGANIZED HEALTH CARE EDUCATION/TRAINING PROGRAM

## 2019-04-10 PROCEDURE — G0463 HOSPITAL OUTPT CLINIC VISIT: HCPCS | Mod: ZF

## 2019-04-10 PROCEDURE — 87385 HISTOPLASMA CAPSUL AG IA: CPT | Performed by: STUDENT IN AN ORGANIZED HEALTH CARE EDUCATION/TRAINING PROGRAM

## 2019-04-10 RX ORDER — ITRACONAZOLE 100 MG/1
100 CAPSULE ORAL 2 TIMES DAILY
Qty: 180 CAPSULE | Refills: 0 | Status: SHIPPED | OUTPATIENT
Start: 2019-04-10 | End: 2019-08-08

## 2019-04-10 RX ORDER — TERBINAFINE HYDROCHLORIDE 250 MG/1
250 TABLET ORAL DAILY
Qty: 120 TABLET | Refills: 0 | Status: SHIPPED | OUTPATIENT
Start: 2019-04-10 | End: 2019-08-08

## 2019-04-10 ASSESSMENT — PAIN SCALES - GENERAL: PAINLEVEL: NO PAIN (0)

## 2019-04-10 ASSESSMENT — MIFFLIN-ST. JEOR: SCORE: 1520.16

## 2019-04-10 NOTE — NURSING NOTE
"BP (!) 83/56   Pulse 73   Temp 98.4  F (36.9  C) (Oral)   Ht 1.778 m (5' 10\")   Wt 73.9 kg (162 lb 14.4 oz)   BMI 23.37 kg/m    Chief Complaint   Patient presents with     RECHECK     follow up with histoplasmosis, tzimmer cma       "

## 2019-04-10 NOTE — PATIENT INSTRUCTIONS
- Get labs today  - Make follow up appointment for in 4 months  - Please ask lung doctor to send us records  -  medications - continue till seen next

## 2019-04-10 NOTE — PROGRESS NOTES
"[facility]  Infectious Disease Clinic Note:  Follow up     Patient:  Haseeb Neal, Date of birth 1950, Medical record number [id number]  Date of Visit:  04/10/2019  Consult requested by Dr. 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     1. Anal/rectal histoplasmosis:  Improved symptoms and histo antigen level improved since being on treatment with Itraconazole. Differential for fungal organisms on path are most likely histo, but could also be blasto. Histoplasma and Blastomyces assays are sometimes mistaken for each other. Serum histo and Blasto antigens were positive in 1/2019, but lower than quantifiable. Treatment would be unchanged nevertheless. Will repeat Histo antigen today to monitor clinical progress. In light of his reported past history of immunosuppression and having been on Prednisone, will likely treat for 12 months. To f/u in 4 months.    2. Cough: With clear sputum, no fevers, chills, sweats, hemoptysis or weight loss. CXR at OSH with no signs of pneumonia. Been treated as COPD exacerbation with recurrent courses of prednisone and Doxy and Azithromycin. Has a 50 PY h/o smoking.  Pulm to see on 3/12. Will defer further workup to Pulm at this time.     3. History of recurrent fungal infections: Patient mentions history of fungal nail infections, possible esophageal candidiasis requiring longterm fluconazole. He also says he was told he had \"low t cells\" in the past. T cell subsets checked at last visit " showing only low CD4/CD8 ratio, otherwise normal. Will plan to repeat in the future. HIV and and immunoglobulins checked at last visit and found to be normal.    4. Onychomycosis: Continue terbinafine. LFTs stable so far. Will repeat today, and at next visit in 4 months.     Plan of care discussed with Staff ID Physician Dr Ricco Goodwin.     Carlos Bardales  PGY4 Infectious Diseases Fellow  Pager: [phone]        History of the Infectious Disease lllness:     Haseeb Neal is a 68 yo M who initially presented with anal pain and mucous discharge for 6 months, and was found to have an ulcer and a mass, with concern for an infection vs neoplasm. On 10/9/18 he had a biopsy of the anal mass, and pathology noted fungal organisms, compatible with histoplasmosis. He was referred to ID for follow up. We started him on Itraconazole on 11/09, ordered urine and serum histoplasma and blastomyces antigens, a CXR, and immunological workup including HIV, T cell subsets, immunoglobulins. T cell subsets showed only low CD4/CD8 ratio, otherwise normal. HIV and and immunoglobulins found to be normal. BD Glucan was positive at 123. 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 He reported fungal infections of his nails for many years. He had difficulty swallowing in the past, which was thought to be due to a fungal infection, and therefore he had been on Fluconazole 100 mg daily, which was discontinued at last visit when itraconazole was started.     He was last seen in follow up on 1/11/2019, when anal ulcer had improved in size with less discharge.  We continued Itraconazole, with plans to re evaluate in 3 months. Repeat histoplasma serum antigen was detected, but not quantifiable.Blastomyces antigen was also sent due to antibody positivity and possibility of cross reaction, also came back as detectable but not quantifiable. He reported a cough, with  past CXR in 11/2018 showing a hyperattenuating perihilar nodularity that may represent calcified granulomas. Repeat CXR was ordered, but patient did not have time to do it then. He got this done at OSH 2 days later and per records there was no s/o pneumonia. Since then, he has been treated with a course of azithromycin, a course of doxycycline and several prednisone courses for presumed COPD exacerbation at OSH. He has a repeat colonoscopy scheduled, and has been referred to Pulm for pre op workup to be seen on 3/12.     Today, he returns for follow up. Reports compliance with his itraconazole and terbinafine. He has almost no pain in his ulcer site anymore, very minimal discharge occasionally.  Cough persists, with clear sputum. Denies fevers, chills, sweats, hemoptysis. We also started Terbinafine 250 mg daily for onychomycosis at last visit, with not much improvement noted per patient.     Review of Systems:  CONSTITUTIONAL:  No fevers or chills. No night sweats.  EYES: negative for icterus or acute vision changes.   ENT:  negative for hearing loss, tinnitus or sore throat  RESPIRATORY:  Cough with clear sputum, SOB  CARDIOVASCULAR:  negative for chest pain, heart palpitations  GASTROINTESTINAL:  negative for nausea, vomiting, diarrhea or constipation  GENITOURINARY:  negative for dysuria or hematuria.  HEME:  No easy bruising or bleeding  INTEGUMENT:  negative for rash or pruritus  NEURO:  Negative for headache or tremor.    Past Medical History:   Diagnosis Date     BPH (benign prostatic hyperplasia)      CAD (coronary artery disease)      COPD (chronic obstructive pulmonary disease) (H)      Histoplasmosis 1/11/2019     History of smoking      HTN (hypertension)      Hyperlipidemia      Old MI (myocardial infarction) 2006     PAD (peripheral artery disease) (H)      Presence of stent in coronary artery 2006     Past Surgical History:   Procedure Laterality Date     AAA REPAIR  04/2016    graft repair      APPENDECTOMY       GR II CORONARY STENT  2006     HERNIA REPAIR      x2     SIGMOIDOSCOPY FLEXIBLE N/A 10/9/2018    Procedure: SIGMOIDOSCOPY FLEXIBLE;  Exam Under Anesthesia, Flexible Sigmoidoscopy, Biopsies;  Surgeon: Radu Asher MD;  Location:  OR     Family History   Problem Relation Age of Onset     Unknown/Adopted Mother      Emphysema Father      Family History Negative Sister      Social History     Social History Narrative    Lives in [place]. 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, camping     Social History     Tobacco Use     Smoking status: Former Smoker     Packs/day: 1.50     Types: Cigarettes     Last attempt to quit: 04/2016     Years since quitting: 3.0     Smokeless tobacco: Never Used   Substance Use Topics     Alcohol use: Yes     Alcohol/week: 12.6 - 16.8 oz     Types: 21 - 28 Cans of beer per week     Drug use: No     Outpatient Medications Marked as Taking for the 4/10/19 encounter (Office Visit) with Carlos Vega MD   Medication Sig     albuterol (VENTOLIN HFA) 108 (90 Base) MCG/ACT inhaler Inhale 1 puff into the lungs every 4 hours as needed      atorvastatin (LIPITOR) 80 MG tablet Take 80 mg by mouth every morning      fluticasone-vilanterol (BREO ELLIPTA) 100-25 MCG/INH inhaler Inhale 1 puff into the lungs every evening      INCRUSE ELLIPTA 62.5 MCG/INH Inhaler Inhale 1 puff into the lungs daily      itraconazole (SPORANOX) 100 MG capsule Take 1 capsule (100 mg) by mouth 2 times daily     itraconazole (SPORANOX) 100 MG capsule Take 1 capsule (100 mg) by mouth 2 times daily     metoprolol tartrate (LOPRESSOR) 25 MG tablet Take 25 mg by mouth every morning      tamsulosin (FLOMAX) 0.4 MG capsule Take 0.4 mg by mouth every morning      terbinafine (LAMISIL) 250 MG tablet Take 1 tablet (250 mg) by mouth daily     Allergies   Allergen Reactions     Penicillins Hives          Physical Exam:   Vitals were reviewed.  All vitals stable  /62   Pulse 66   Temp 98.4  F  "(36.9  C) (Oral)   Ht 1.778 m (5' 10\")   Wt 73.9 kg (162 lb 14.4 oz)   BMI 23.37 kg/m      Exam:  GENERAL:  well-developed, well-nourished, alert, oriented, in no acute distress.  HEENT:  Head is normocephalic, atraumatic   EYES:  Eyes have anicteric sclerae.    ENT:  Oropharynx is moist without exudates or ulcers.  NECK:  Supple.  LUNGS:  Clear to auscultation.  CARDIOVASCULAR:  Regular rate and rhythm with no murmurs, gallops or rubs.  ABDOMEN:  Normal bowel sounds, soft, nontender.  SKIN: Perianal ulcer now almost impercetible, no erythema, warmth, fluctuance, discharge. Yellowish discoloration and deformity of nails of left hand and both toes.   NEUROLOGIC:  Grossly nonfocal.         Laboratory Data:     OSH labs 3/719 (scanned):    Metabolic Studies   Cr 0.8  LFTs normal  Lipid panel normal    Hematology Studies    WBC 6.0  Hb 14.4  Platelets 181    Hepatic Studies    Recent Labs   Lab Test 01/11/19  1324 09/17/18  1540   BILITOTAL 0.6 0.7   DBIL 0.2  --    ALKPHOS 131 160*   PROTTOTAL 7.4 8.3   ALBUMIN 3.3* 3.2*   AST 55* 76*   ALT 44 63     Iron Testing    Recent Labs   Lab Test 09/17/18  1540   MCV 91     Microbiology:  11/9/2018:  1,3 BD Glucan 123  Fungal antibodies: Aspergillus negative, Blastomyces positive, Histoplasma, Coccidiomyces negative  HIV: NR   Urine histoplasma antigen - 0.5  Blood histoplasma - 0.83  IgG 1630  IgA 644  IgM 116    T cell subsets:   Component Value Flag Ref Range Units Status Collected Lab   IFC Specimen Blood     Final 11/09/2018  4:21 PM 51   CD3 Mature T 82   49 - 84 % Final 11/09/2018  4:21 PM 51   CD4 [place] T 40   28 - 63 % Final 11/09/2018  4:21 PM 51   CD8 Suppressor T 36   10 - 40 % Final 11/09/2018  4:21 PM 51   CD4:CD8 Ratio 1.11 Abnormally low   L  1.40 - 2.60  Final 11/09/2018  4:21 PM 51   Absolute CD3 1,673   603 - 2,990 cells/uL Final 11/09/2018  4:21 PM 51   Absolute CD4 815   441 - 2,156 cells/uL Final 11/09/2018  4:21 PM 51   Absolute CD8 740   125 - 1,312 " cells/uL Final 11/09/2018  4:21 PM 5     Blood histoplasma antigen: positive but less than quantifiable 1/11/19  Blood blastomyces antigen: positive but less than quantifable 1/11/19    Imaging:  Results for orders placed or performed in visit on 09/28/18   MR Pelvis Musclular Tissue wo & w Contrast    Narrative    EXAMINATION: MR PELVIS MUSCULAR TISSUE WO & W CONTRAST,  9/28/2018 7:29 PM     COMPARISON: CT abdomen/pelvis without contrast 5/1/2016    TECHNIQUE:  Images were acquired without and with intravenous contrast  through the pelvis. The following MR images were acquired without  contrast: multiplanar T1, multiplanar T2, axial diffusion-weighted and  axial apparent diffusion coefficient. T1-weighted images with fat  saturation were acquired at the following intervals relative to  intravenous contrast administration: pre-contrast, immediate post  contrast, 1 minute, 3 minutes and delayed.  Contrast dose: 7.5mL  Gadavist, 0.5 glucogon    HISTORY: ; Rectal prolapse; Rectal ulcer; Anal pain    FINDINGS:    LOCATION/SIZE: In the right posterolateral anus extending from the  internal sphincter through the external sphincter to the anal verge,  there is a 4.3 cm mass with intermediate T2 signal intensity,  relatively homogeneous enhancement, and restricted diffusion (series 6  images 42-47).    EXTENT: The tumor involves the internal and external sphincters. No  involvement of the levator ani musculature.  Inferiorly, the mass  involves the right medial intergluteal cleft.    LYMPH NODES: No overtly suspicious pelvic lymphadenopathy. There are  scattered small pelvic lymph nodes, for example a 7 mm right iliac  chain lymph node (series 6 image 16) and a 3 mm lymph node in the  right posterior mesial rectal fat (series 6 image 23).     MISCELLANEOUS FINDINGS:  Multiple fluid-filled bladder diverticula. There is sigmoid  diverticulosis. Changes of aortobifemoral graft placement with patent  graft and occluded native  iliac vessels.  In the posterior  subcutaneous tissues, in the coccyx there is a 1.2 x 0.7 cm ovoid  enhancing nodule (series 15 image 49). This has decreased T2 signal  suggesting prior infectious or traumatic etiology without findings to  suggest malignancy.         Impression    IMPRESSION:  4.3 cm mass in the right posterolateral anus extending  from the internal sphincter through the external sphincter to the  right gluteal fold. The mass also approaches the puborectalis  posteriorly in the midline. Findings are concerning for malignancy.    I have personally reviewed the examination and initial interpretation  and I agree with the findings.    CLARENCE HERNANDEZ MD     CXR 11/9:    IMPRESSION:   1.  Hyperattenuating perihilar nodularity, may represent calcified  granulomas.  2.  No acute airspace disease.    Pathology 10/9/18  FINAL DIAGNOSIS:   A. PERIANAL SKIN, RIGHT LATERAL, BIOPSY:   - Ulcerated perianal skin with adjacent pseudoepitheliomatous hyperplasia,    granuloma formation, marked chronic   inflammation   - Fungal organisms morphologically compatible with histoplasmosis, see   comment   - Negative for malignancy     B. RIGHT ANAL MASS, BIOPSY:   - Scant fibroadipose tissue   - Negative for malignancy     COMMENT:   Preliminary diagnosis given to Dr. Radu Asher by Dr. Virginia Trevino    on 10/11/18 at 13:49.  GMS stain   confirms the presence of fungal organisms (yeast forms), PAS stain   highlights the organism as well.  Erin stain is negative for AFB.

## 2019-04-15 LAB — LAB SCANNED RESULT: NORMAL

## 2019-06-15 ENCOUNTER — TRANSFERRED RECORDS (OUTPATIENT)
Dept: HEALTH INFORMATION MANAGEMENT | Facility: CLINIC | Age: 69
End: 2019-06-15

## 2019-07-10 ENCOUNTER — TELEPHONE (OUTPATIENT)
Dept: INFECTIOUS DISEASES | Facility: CLINIC | Age: 69
End: 2019-07-10

## 2019-07-10 NOTE — TELEPHONE ENCOUNTER
[facility] Call Center    Phone Message    May a detailed message be left on voicemail: yes    Reason for Call: Other: Patient's significant other calling.  The itraconazole was continued by patient's primary, Dr. Maxine lopes [facility].  Dr. Lema is wondering how often patient should have liver enzyme tests done, how high is too high, and how long should he remain on the itraconazole.  Please contact Dr. Lema's office at [facility], [phone].      Action Taken: Message routed to:  [facility] ([facility]): Infectious Disease

## 2019-07-26 NOTE — TELEPHONE ENCOUNTER
Silvia, MD Norm Mcgovern Abby, RN Cc: Shauna Lowe, Haseeb Cotto will need to be on Itraconazole at least until November, 2019. LFTs can be checked monthly. If they are found to be elevated and <500s, plan will be to repeat them in a few days, and if rising to call us.     Thanks,   Carlos STEVENSON with Dr. Lema's care coordinators, [employer] [facility], relaying recommendations from Dr. Bardales above.  Shauna Lowe, RN

## 2019-08-01 ENCOUNTER — TELEPHONE (OUTPATIENT)
Dept: INFECTIOUS DISEASES | Facility: CLINIC | Age: 69
End: 2019-08-01

## 2019-08-07 ENCOUNTER — OFFICE VISIT (OUTPATIENT)
Dept: INFECTIOUS DISEASES | Facility: CLINIC | Age: 69
End: 2019-08-07
Attending: INTERNAL MEDICINE
Payer: MEDICARE

## 2019-08-07 VITALS
HEART RATE: 75 BPM | SYSTOLIC BLOOD PRESSURE: 86 MMHG | WEIGHT: 151 LBS | TEMPERATURE: 97.6 F | DIASTOLIC BLOOD PRESSURE: 59 MMHG | BODY MASS INDEX: 21.67 KG/M2 | OXYGEN SATURATION: 97 %

## 2019-08-07 DIAGNOSIS — B39.9 HISTOPLASMOSIS: ICD-10-CM

## 2019-08-07 DIAGNOSIS — B39.9 HISTOPLASMOSIS: Primary | ICD-10-CM

## 2019-08-07 LAB
ALBUMIN SERPL-MCNC: 3.2 G/DL (ref 3.4–5)
ALP SERPL-CCNC: 94 U/L (ref 40–150)
ALT SERPL W P-5'-P-CCNC: 39 U/L (ref 0–70)
AST SERPL W P-5'-P-CCNC: 40 U/L (ref 0–45)
BILIRUB DIRECT SERPL-MCNC: 0.2 MG/DL (ref 0–0.2)
BILIRUB SERPL-MCNC: 0.7 MG/DL (ref 0.2–1.3)
CK SERPL-CCNC: 25 U/L (ref 30–300)
PROT SERPL-MCNC: 6.6 G/DL (ref 6.8–8.8)

## 2019-08-07 PROCEDURE — 80076 HEPATIC FUNCTION PANEL: CPT | Performed by: STUDENT IN AN ORGANIZED HEALTH CARE EDUCATION/TRAINING PROGRAM

## 2019-08-07 PROCEDURE — 82550 ASSAY OF CK (CPK): CPT | Performed by: STUDENT IN AN ORGANIZED HEALTH CARE EDUCATION/TRAINING PROGRAM

## 2019-08-07 PROCEDURE — 36415 COLL VENOUS BLD VENIPUNCTURE: CPT | Performed by: STUDENT IN AN ORGANIZED HEALTH CARE EDUCATION/TRAINING PROGRAM

## 2019-08-07 PROCEDURE — G0463 HOSPITAL OUTPT CLINIC VISIT: HCPCS | Mod: ZF

## 2019-08-07 RX ORDER — ASPIRIN 81 MG/1
81 TABLET, CHEWABLE ORAL
COMMUNITY

## 2019-08-07 RX ORDER — ITRACONAZOLE 100 MG/1
100 CAPSULE ORAL 2 TIMES DAILY
Qty: 30 CAPSULE | Refills: 3 | Status: SHIPPED | OUTPATIENT
Start: 2019-08-07

## 2019-08-07 ASSESSMENT — PAIN SCALES - GENERAL: PAINLEVEL: NO PAIN (0)

## 2019-08-07 NOTE — PROGRESS NOTES
"[facility]  Infectious Disease Clinic Note:  Follow up     Patient:  Haseeb Neal, Date of birth 1950, Medical record number [id number]  Date of Visit:  08/07/2019  Consult requested by Dr. 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     1. Anal/rectal histoplasmosis:  Improved symptoms and histo antigen level improved since being on treatment with Itraconazole. Differential for fungal organisms on path are most likely histo, but could also be blasto. Histoplasma and Blastomyces assays are sometimes mistaken for each other. Serum histo and Blasto antigens were positive in 1/2019, but lower than quantifiable. Repeat Histoplasma antigen undetectable in 4/2019. In light of his reported past history of immunosuppression and having been on Prednisone, will likely treat for 12 months, which is to end around 11/09/2019. Would also repeat MRI Pelvis to determine progress of anal mass. LFTs being followed monthly by PCP, last in July with mild elevation (scanned), will repeat today.     2. History of recurrent fungal infections: Patient with history of fungal nail infections, possible esophageal candidiasis requiring longterm fluconazole. He also says he was told he had \"low t cells\" in the past. T cell subsets checked in 1/2019t showing only low CD4/CD8 ratio, otherwise normal. Will plan to repeat in the future. HIV and and " immunoglobulins checked also found to be normal.    3. Onychomycosis: Stopped at OSH due to elevated LFTs. Pt reports not being too bothered by his symptoms. Will hold for now while on Itraconazole.     Plan of care discussed with Staff ID Physician Dr Ricco Goodwin.     Carlos Bardales  PGY4 Infectious Diseases Fellow  Pager: [phone]        History of the Infectious Disease lllness:     Haseeb Neal is a 68 yo M who initially presented to [facility] with anal pain and mucous discharge for 6 months, and was found to have an ulcer and a mass, with concern for an infection vs neoplasm. On 10/9/18 he had a biopsy of the anal mass, and pathology noted fungal organisms, compatible with histoplasmosis. He was referred to ID for follow up. We started him on Itraconazole on 11/09. BD Glucan was positive at 123. Histoplasma serum antigen was 0.83, urine at 0.5. When last seen in follow up on 1/11 and 4/10/2019, anal ulcer had improved in size with less discharge. Repeat histoplasma serum antigen was detected, but not quantifiable in 1/2019 and undetectable in 4/2019. Blastomyces antigen was also sent due to antibody positivity and possibility of cross reaction, also came back as detectable but not quantifiable in 1/2019. Today, he returns for follow up. He has no pain or discharge anymore. Reports compliance with his itraconazole. Denies fevers, chills, sweats.    We also started Terbinafine 250 mg daily for onychomycosis in 1/2019, with not much improvement noted. Was stopped at OSH per pt due to elevated LFTs.     He reported a cough, with past CXR in 11/2018 showing a hyperattenuating perihilar nodularity that may represent calcified granulomas. Repeat CXR was ordered, which he got done at OSH 2 days later and per records there was no s/o pneumonia. Since then, he has been treated with a course of azithromycin, a course of doxycycline and several prednisone courses for presumed COPD exacerbation at OSH. He has had two episodes  of COPD exacerbations since last visit, admitted at [facility]. Now doing better. Pulm visit upcoming.    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 He reported fungal infections of his nails for many years. He had difficulty swallowing in the past, which was thought to be due to a fungal infection, and therefore he had been on Fluconazole 100 mg daily, which was discontinued when itraconazole was started. T cell subsets checked in 1/2019 showing only low CD4/CD8 ratio, otherwise normal. HIV and and immunoglobulins also found to be normal.    Review of Systems:  CONSTITUTIONAL:  No fevers or chills. No night sweats.  EYES: negative for icterus or acute vision changes.   ENT:  negative for hearing loss, tinnitus or sore throat  RESPIRATORY:  Cough with clear sputum, SOB  CARDIOVASCULAR:  negative for chest pain, heart palpitations  GASTROINTESTINAL:  negative for nausea, vomiting, diarrhea or constipation  GENITOURINARY:  negative for dysuria or hematuria.  HEME:  No easy bruising or bleeding  INTEGUMENT:  negative for rash or pruritus  NEURO:  Negative for headache or tremor.    Past Medical History:   Diagnosis Date     BPH (benign prostatic hyperplasia)      CAD (coronary artery disease)      COPD (chronic obstructive pulmonary disease) (H)      Histoplasmosis 1/11/2019     History of smoking      HTN (hypertension)      Hyperlipidemia      Old MI (myocardial infarction) 2006     PAD (peripheral artery disease) (H)      Presence of stent in coronary artery 2006     Past Surgical History:   Procedure Laterality Date     AAA REPAIR  04/2016    graft repair     APPENDECTOMY       GR II CORONARY STENT  2006     HERNIA REPAIR      x2     SIGMOIDOSCOPY FLEXIBLE N/A 10/9/2018    Procedure: SIGMOIDOSCOPY FLEXIBLE;  Exam Under Anesthesia, Flexible Sigmoidoscopy, Biopsies;  Surgeon: Radu Asher MD;  Location:  OR     Family History   Problem Relation Age of Onset      Unknown/Adopted Mother      Emphysema Father      Family History Negative Sister      Social History     Social History Narrative    Lives in [facility]. 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, camping     Social History     Tobacco Use     Smoking status: Former Smoker     Packs/day: 1.50     Types: Cigarettes     Last attempt to quit: 04/2016     Years since quitting: 3.3     Smokeless tobacco: Never Used   Substance Use Topics     Alcohol use: Yes     Alcohol/week: 12.6 - 16.8 oz     Types: 21 - 28 Cans of beer per week     Drug use: No     Outpatient Medications Marked as Taking for the 8/7/19 encounter (Office Visit) with Carlos Vega MD   Medication Sig     albuterol (VENTOLIN HFA) 108 (90 Base) MCG/ACT inhaler Inhale 1 puff into the lungs every 4 hours as needed      aspirin (ASA) 81 MG chewable tablet Take 81 mg by mouth     atorvastatin (LIPITOR) 80 MG tablet Take 40 mg by mouth every morning      fluticasone-vilanterol (BREO ELLIPTA) 100-25 MCG/INH inhaler Inhale 1 puff into the lungs every evening      INCRUSE ELLIPTA 62.5 MCG/INH Inhaler Inhale 1 puff into the lungs daily      itraconazole (SPORANOX) 100 MG capsule Take 1 capsule (100 mg) by mouth 2 times daily     itraconazole (SPORANOX) 100 MG capsule Take 1 capsule (100 mg) by mouth 2 times daily     itraconazole (SPORANOX) 100 MG capsule Take 1 capsule (100 mg) by mouth 2 times daily     metoprolol tartrate (LOPRESSOR) 25 MG tablet Take 25 mg by mouth every morning      tamsulosin (FLOMAX) 0.4 MG capsule Take 0.4 mg by mouth every morning      Allergies   Allergen Reactions     Penicillins Hives          Physical Exam:   Vitals were reviewed.  All vitals stable  BP (!) 86/59 (BP Location: Right arm, Patient Position: Sitting, Cuff Size: Adult Regular)   Pulse 75   Temp 97.6  F (36.4  C) (Oral)   Wt 68.5 kg (151 lb)   SpO2 97%   BMI 21.67 kg/m      Exam:  GENERAL:  well-developed, well-nourished, alert, oriented, in no acute distress.  HEENT:   Head is normocephalic, atraumatic   EYES:  Eyes have anicteric sclerae.    ENT:  Oropharynx is moist without exudates or ulcers.  NECK:  Supple.  LUNGS:  Clear to auscultation.  CARDIOVASCULAR:  Regular rate and rhythm with no murmurs, gallops or rubs.  ABDOMEN:  Normal bowel sounds, soft, nontender.  SKIN: Perianal ulcer resolved, no erythema, warmth, fluctuance, discharge. Yellowish discoloration and deformity of nails of left hand and both toes.   NEUROLOGIC:  Grossly nonfocal.         Laboratory Data:     Hepatic Studies    Recent Labs   Lab Test 04/10/19  1438 01/11/19  1324  09/17/18  1540   BILITOTAL 0.6 0.6  --  0.7   DBIL 0.2 0.2   < >  --    ALKPHOS 109 131  --  160*   PROTTOTAL 6.7* 7.4  --  8.3   ALBUMIN 3.1* 3.3*  --  3.2*   AST 41 55*  --  76*   ALT 34 44  --  63    < > = values in this interval not displayed.     Iron Testing    Recent Labs   Lab Test 09/17/18  1540   MCV 91     Microbiology:  11/9/2018:  1,3 BD Glucan 123  Fungal antibodies: Aspergillus negative, Blastomyces positive, Histoplasma, Coccidiomyces negative  HIV: NR   Urine histoplasma antigen - 0.5  Blood histoplasma - 0.83  IgG 1630  IgA 644  IgM 116    T cell subsets:   Component Value Flag Ref Range Units Status Collected Lab   IFC Specimen Blood     Final 11/09/2018  4:21 PM 51   CD3 Mature T 82   49 - 84 % Final 11/09/2018  4:21 PM 51   CD4 [place] T 40   28 - 63 % Final 11/09/2018  4:21 PM 51   CD8 Suppressor T 36   10 - 40 % Final 11/09/2018  4:21 PM 51   CD4:CD8 Ratio 1.11 Abnormally low   L  1.40 - 2.60  Final 11/09/2018  4:21 PM 51   Absolute CD3 1,673   603 - 2,990 cells/uL Final 11/09/2018  4:21 PM 51   Absolute CD4 815   441 - 2,156 cells/uL Final 11/09/2018  4:21 PM 51   Absolute CD8 740   125 - 1,312 cells/uL Final 11/09/2018  4:21 PM 5     Blood histoplasma antigen: positive but less than quantifiable 1/11/19  Blood blastomyces antigen: positive but less than quantifable 1/11/19    Imaging:  Results for orders placed or  performed in visit on 09/28/18   MR Pelvis Musclular Tissue wo & w Contrast    Narrative    EXAMINATION: MR PELVIS MUSCULAR TISSUE WO & W CONTRAST,  9/28/2018 7:29 PM     COMPARISON: CT abdomen/pelvis without contrast 5/1/2016    TECHNIQUE:  Images were acquired without and with intravenous contrast  through the pelvis. The following MR images were acquired without  contrast: multiplanar T1, multiplanar T2, axial diffusion-weighted and  axial apparent diffusion coefficient. T1-weighted images with fat  saturation were acquired at the following intervals relative to  intravenous contrast administration: pre-contrast, immediate post  contrast, 1 minute, 3 minutes and delayed.  Contrast dose: 7.5mL  Gadavist, 0.5 glucogon    HISTORY: ; Rectal prolapse; Rectal ulcer; Anal pain    FINDINGS:    LOCATION/SIZE: In the right posterolateral anus extending from the  internal sphincter through the external sphincter to the anal verge,  there is a 4.3 cm mass with intermediate T2 signal intensity,  relatively homogeneous enhancement, and restricted diffusion (series 6  images 42-47).    EXTENT: The tumor involves the internal and external sphincters. No  involvement of the levator ani musculature.  Inferiorly, the mass  involves the right medial intergluteal cleft.    LYMPH NODES: No overtly suspicious pelvic lymphadenopathy. There are  scattered small pelvic lymph nodes, for example a 7 mm right iliac  chain lymph node (series 6 image 16) and a 3 mm lymph node in the  right posterior mesial rectal fat (series 6 image 23).     MISCELLANEOUS FINDINGS:  Multiple fluid-filled bladder diverticula. There is sigmoid  diverticulosis. Changes of aortobifemoral graft placement with patent  graft and occluded native iliac vessels.  In the posterior  subcutaneous tissues, in the coccyx there is a 1.2 x 0.7 cm ovoid  enhancing nodule (series 15 image 49). This has decreased T2 signal  suggesting prior infectious or traumatic etiology  without findings to  suggest malignancy.         Impression    IMPRESSION:  4.3 cm mass in the right posterolateral anus extending  from the internal sphincter through the external sphincter to the  right gluteal fold. The mass also approaches the puborectalis  posteriorly in the midline. Findings are concerning for malignancy.    I have personally reviewed the examination and initial interpretation  and I agree with the findings.    CLARENCE HERNANDEZ MD     CXR 11/9:    IMPRESSION:   1.  Hyperattenuating perihilar nodularity, may represent calcified  granulomas.  2.  No acute airspace disease.    Pathology 10/9/18  FINAL DIAGNOSIS:   A. PERIANAL SKIN, RIGHT LATERAL, BIOPSY:   - Ulcerated perianal skin with adjacent pseudoepitheliomatous hyperplasia,    granuloma formation, marked chronic   inflammation   - Fungal organisms morphologically compatible with histoplasmosis, see   comment   - Negative for malignancy     B. RIGHT ANAL MASS, BIOPSY:   - Scant fibroadipose tissue   - Negative for malignancy     COMMENT:   Preliminary diagnosis given to Dr. Radu Asher by Dr. Virginai Trevino    on 10/11/18 at 13:49.  GMS stain   confirms the presence of fungal organisms (yeast forms), PAS stain   highlights the organism as well.  Erin stain is negative for AFB.

## 2019-08-07 NOTE — NURSING NOTE
Chief Complaint   Patient presents with     RECHECK     Rectal Histoplasmosis     BP (!) 86/59 (BP Location: Right arm, Patient Position: Sitting, Cuff Size: Adult Regular)   Pulse 75   Temp 97.6  F (36.4  C) (Oral)   Wt 68.5 kg (151 lb)   SpO2 97%   BMI 21.67 kg/m      Cristy Sol CMA    8/7/2019 1:05 PM

## 2019-08-07 NOTE — LETTER
"8/7/2019      RE: Haseeb Neal  [address]   [facility] [address]       [facility]  Infectious Disease Clinic Note:  Follow up     Patient:  Haseeb Neal, Date of birth 1950, Medical record number [id number]  Date of Visit:  08/07/2019  Consult requested by Dr. 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     1. Anal/rectal histoplasmosis:  Improved symptoms and histo antigen level improved since being on treatment with Itraconazole. Differential for fungal organisms on path are most likely histo, but could also be blasto. Histoplasma and Blastomyces assays are sometimes mistaken for each other. Serum histo and Blasto antigens were positive in 1/2019, but lower than quantifiable. Repeat Histoplasma antigen undetectable in 4/2019. In light of his reported past history of immunosuppression and having been on Prednisone, will likely treat for 12 months, which is to end around 11/09/2019. Would also repeat MRI Pelvis to determine progress of anal mass. LFTs being followed monthly by PCP, last in July with mild elevation (scanned), will repeat today.     2. History of recurrent fungal infections: Patient with history of fungal nail infections, possible esophageal candidiasis requiring longterm fluconazole. He also says he was told he had \"low t cells\" in the past. T cell subsets checked in 1/2019t showing only low " CD4/CD8 ratio, otherwise normal. Will plan to repeat in the future. HIV and and immunoglobulins checked also found to be normal.    3. Onychomycosis: Stopped at OSH due to elevated LFTs. Pt reports not being too bothered by his symptoms. Will hold for now while on Itraconazole.     Plan of care discussed with Staff ID Physician Dr Ricco Goodwin.     Carlos Bardales  PGY4 Infectious Diseases Fellow  Pager: [phone]        History of the Infectious Disease lllness:     Haseeb Neal is a 66 yo M who initially presented to [facility] with anal pain and mucous discharge for 6 months, and was found to have an ulcer and a mass, with concern for an infection vs neoplasm. On 10/9/18 he had a biopsy of the anal mass, and pathology noted fungal organisms, compatible with histoplasmosis. He was referred to ID for follow up. We started him on Itraconazole on 11/09. BD Glucan was positive at 123. Histoplasma serum antigen was 0.83, urine at 0.5. When last seen in follow up on 1/11 and 4/10/2019, anal ulcer had improved in size with less discharge. Repeat histoplasma serum antigen was detected, but not quantifiable in 1/2019 and undetectable in 4/2019. Blastomyces antigen was also sent due to antibody positivity and possibility of cross reaction, also came back as detectable but not quantifiable in 1/2019. Today, he returns for follow up. He has no pain or discharge anymore. Reports compliance with his itraconazole. Denies fevers, chills, sweats.    We also started Terbinafine 250 mg daily for onychomycosis in 1/2019, with not much improvement noted. Was stopped at OSH per pt due to elevated LFTs.     He reported a cough, with past CXR in 11/2018 showing a hyperattenuating perihilar nodularity that may represent calcified granulomas. Repeat CXR was ordered, which he got done at OSH 2 days later and per records there was no s/o pneumonia. Since then, he has been treated with a course of azithromycin, a course of doxycycline and several  prednisone courses for presumed COPD exacerbation at OSH. He has had two episodes of COPD exacerbations since last visit, admitted at [facility]. Now doing better. Pulm visit upcoming.    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 He reported fungal infections of his nails for many years. He had difficulty swallowing in the past, which was thought to be due to a fungal infection, and therefore he had been on Fluconazole 100 mg daily, which was discontinued when itraconazole was started. T cell subsets checked in 1/2019 showing only low CD4/CD8 ratio, otherwise normal. HIV and and immunoglobulins also found to be normal.    Review of Systems:  CONSTITUTIONAL:  No fevers or chills. No night sweats.  EYES: negative for icterus or acute vision changes.   ENT:  negative for hearing loss, tinnitus or sore throat  RESPIRATORY:  Cough with clear sputum, SOB  CARDIOVASCULAR:  negative for chest pain, heart palpitations  GASTROINTESTINAL:  negative for nausea, vomiting, diarrhea or constipation  GENITOURINARY:  negative for dysuria or hematuria.  HEME:  No easy bruising or bleeding  INTEGUMENT:  negative for rash or pruritus  NEURO:  Negative for headache or tremor.    Past Medical History:   Diagnosis Date     BPH (benign prostatic hyperplasia)      CAD (coronary artery disease)      COPD (chronic obstructive pulmonary disease) (H)      Histoplasmosis 1/11/2019     History of smoking      HTN (hypertension)      Hyperlipidemia      Old MI (myocardial infarction) 2006     PAD (peripheral artery disease) (H)      Presence of stent in coronary artery 2006     Past Surgical History:   Procedure Laterality Date     AAA REPAIR  04/2016    graft repair     APPENDECTOMY       GR II CORONARY STENT  2006     HERNIA REPAIR      x2     SIGMOIDOSCOPY FLEXIBLE N/A 10/9/2018    Procedure: SIGMOIDOSCOPY FLEXIBLE;  Exam Under Anesthesia, Flexible Sigmoidoscopy, Biopsies;  Surgeon: Radu Asher MD;   Location: [facility] OR     Family History   Problem Relation Age of Onset     Unknown/Adopted Mother      Emphysema Father      Family History Negative Sister      Social History     Social History Narrative    Lives in [facility]. 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, camping     Social History     Tobacco Use     Smoking status: Former Smoker     Packs/day: 1.50     Types: Cigarettes     Last attempt to quit: 04/2016     Years since quitting: 3.3     Smokeless tobacco: Never Used   Substance Use Topics     Alcohol use: Yes     Alcohol/week: 12.6 - 16.8 oz     Types: 21 - 28 Cans of beer per week     Drug use: No     Outpatient Medications Marked as Taking for the 8/7/19 encounter (Office Visit) with Carlos Vega MD   Medication Sig     albuterol (VENTOLIN HFA) 108 (90 Base) MCG/ACT inhaler Inhale 1 puff into the lungs every 4 hours as needed      aspirin (ASA) 81 MG chewable tablet Take 81 mg by mouth     atorvastatin (LIPITOR) 80 MG tablet Take 40 mg by mouth every morning      fluticasone-vilanterol (BREO ELLIPTA) 100-25 MCG/INH inhaler Inhale 1 puff into the lungs every evening      INCRUSE ELLIPTA 62.5 MCG/INH Inhaler Inhale 1 puff into the lungs daily      itraconazole (SPORANOX) 100 MG capsule Take 1 capsule (100 mg) by mouth 2 times daily     itraconazole (SPORANOX) 100 MG capsule Take 1 capsule (100 mg) by mouth 2 times daily     itraconazole (SPORANOX) 100 MG capsule Take 1 capsule (100 mg) by mouth 2 times daily     metoprolol tartrate (LOPRESSOR) 25 MG tablet Take 25 mg by mouth every morning      tamsulosin (FLOMAX) 0.4 MG capsule Take 0.4 mg by mouth every morning      Allergies   Allergen Reactions     Penicillins Hives          Physical Exam:   Vitals were reviewed.  All vitals stable  BP (!) 86/59 (BP Location: Right arm, Patient Position: Sitting, Cuff Size: Adult Regular)   Pulse 75   Temp 97.6  F (36.4  C) (Oral)   Wt 68.5 kg (151 lb)   SpO2 97%   BMI 21.67 kg/m       Exam:  GENERAL:   well-developed, well-nourished, alert, oriented, in no acute distress.  HEENT:  Head is normocephalic, atraumatic   EYES:  Eyes have anicteric sclerae.    ENT:  Oropharynx is moist without exudates or ulcers.  NECK:  Supple.  LUNGS:  Clear to auscultation.  CARDIOVASCULAR:  Regular rate and rhythm with no murmurs, gallops or rubs.  ABDOMEN:  Normal bowel sounds, soft, nontender.  SKIN: Perianal ulcer resolved, no erythema, warmth, fluctuance, discharge. Yellowish discoloration and deformity of nails of left hand and both toes.   NEUROLOGIC:  Grossly nonfocal.         Laboratory Data:     Hepatic Studies    Recent Labs   Lab Test 04/10/19  1438 01/11/19  1324  09/17/18  1540   BILITOTAL 0.6 0.6  --  0.7   DBIL 0.2 0.2   < >  --    ALKPHOS 109 131  --  160*   PROTTOTAL 6.7* 7.4  --  8.3   ALBUMIN 3.1* 3.3*  --  3.2*   AST 41 55*  --  76*   ALT 34 44  --  63    < > = values in this interval not displayed.     Iron Testing    Recent Labs   Lab Test 09/17/18  1540   MCV 91     Microbiology:  11/9/2018:  1,3 BD Glucan 123  Fungal antibodies: Aspergillus negative, Blastomyces positive, Histoplasma, Coccidiomyces negative  HIV: NR   Urine histoplasma antigen - 0.5  Blood histoplasma - 0.83  IgG 1630  IgA 644  IgM 116    T cell subsets:   Component Value Flag Ref Range Units Status Collected Lab   IFC Specimen Blood     Final 11/09/2018  4:21 PM 51   CD3 Mature T 82   49 - 84 % Final 11/09/2018  4:21 PM 51   CD4 [place] T 40   28 - 63 % Final 11/09/2018  4:21 PM 51   CD8 Suppressor T 36   10 - 40 % Final 11/09/2018  4:21 PM 51   CD4:CD8 Ratio 1.11 Abnormally low   L  1.40 - 2.60  Final 11/09/2018  4:21 PM 51   Absolute CD3 1,673   603 - 2,990 cells/uL Final 11/09/2018  4:21 PM 51   Absolute CD4 815   441 - 2,156 cells/uL Final 11/09/2018  4:21 PM 51   Absolute CD8 740   125 - 1,312 cells/uL Final 11/09/2018  4:21 PM 5     Blood histoplasma antigen: positive but less than quantifiable 1/11/19  Blood blastomyces antigen:  positive but less than quantifable 1/11/19    Imaging:  Results for orders placed or performed in visit on 09/28/18   MR Pelvis Musclular Tissue wo & w Contrast    Narrative    EXAMINATION: MR PELVIS MUSCULAR TISSUE WO & W CONTRAST,  9/28/2018 7:29 PM     COMPARISON: CT abdomen/pelvis without contrast 5/1/2016    TECHNIQUE:  Images were acquired without and with intravenous contrast  through the pelvis. The following MR images were acquired without  contrast: multiplanar T1, multiplanar T2, axial diffusion-weighted and  axial apparent diffusion coefficient. T1-weighted images with fat  saturation were acquired at the following intervals relative to  intravenous contrast administration: pre-contrast, immediate post  contrast, 1 minute, 3 minutes and delayed.  Contrast dose: 7.5mL  Gadavist, 0.5 glucogon    HISTORY: ; Rectal prolapse; Rectal ulcer; Anal pain    FINDINGS:    LOCATION/SIZE: In the right posterolateral anus extending from the  internal sphincter through the external sphincter to the anal verge,  there is a 4.3 cm mass with intermediate T2 signal intensity,  relatively homogeneous enhancement, and restricted diffusion (series 6  images 42-47).    EXTENT: The tumor involves the internal and external sphincters. No  involvement of the levator ani musculature.  Inferiorly, the mass  involves the right medial intergluteal cleft.    LYMPH NODES: No overtly suspicious pelvic lymphadenopathy. There are  scattered small pelvic lymph nodes, for example a 7 mm right iliac  chain lymph node (series 6 image 16) and a 3 mm lymph node in the  right posterior mesial rectal fat (series 6 image 23).     MISCELLANEOUS FINDINGS:  Multiple fluid-filled bladder diverticula. There is sigmoid  diverticulosis. Changes of aortobifemoral graft placement with patent  graft and occluded native iliac vessels.  In the posterior  subcutaneous tissues, in the coccyx there is a 1.2 x 0.7 cm ovoid  enhancing nodule (series 15 image 49).  This has decreased T2 signal  suggesting prior infectious or traumatic etiology without findings to  suggest malignancy.         Impression    IMPRESSION:  4.3 cm mass in the right posterolateral anus extending  from the internal sphincter through the external sphincter to the  right gluteal fold. The mass also approaches the puborectalis  posteriorly in the midline. Findings are concerning for malignancy.    I have personally reviewed the examination and initial interpretation  and I agree with the findings.    CLARENCE HERNANDEZ MD     CXR 11/9:    IMPRESSION:   1.  Hyperattenuating perihilar nodularity, may represent calcified  granulomas.  2.  No acute airspace disease.    Pathology 10/9/18  FINAL DIAGNOSIS:   A. PERIANAL SKIN, RIGHT LATERAL, BIOPSY:   - Ulcerated perianal skin with adjacent pseudoepitheliomatous hyperplasia,    granuloma formation, marked chronic   inflammation   - Fungal organisms morphologically compatible with histoplasmosis, see   comment   - Negative for malignancy     B. RIGHT ANAL MASS, BIOPSY:   - Scant fibroadipose tissue   - Negative for malignancy     COMMENT:   Preliminary diagnosis given to Dr. Radu Asher by Dr. Virginia Trevino    on 10/11/18 at 13:49.  GMS stain   confirms the presence of fungal organisms (yeast forms), PAS stain   highlights the organism as well.  Erin stain is negative for AFB.       Infectious Disease Clinic Staff Note: Mr. Neal was seen, examined, and the case was discussed with Dr. Vega, ID Fellow -- I agree with her interval history and examination, assessment and plan in this outpatient ID Progress note. This note reflects my observations and opinions and the plan outlined fully reflects my approach. I have reviewed the available history, radiology, laboratory results, and reports with the Fellow.    Carlos Vega MD

## 2019-11-06 ENCOUNTER — TRANSFERRED RECORDS (OUTPATIENT)
Dept: HEALTH INFORMATION MANAGEMENT | Facility: CLINIC | Age: 69
End: 2019-11-06

## 2019-11-06 ENCOUNTER — ANCILLARY PROCEDURE (OUTPATIENT)
Dept: MRI IMAGING | Facility: CLINIC | Age: 69
End: 2019-11-06
Payer: COMMERCIAL

## 2019-11-06 ENCOUNTER — OFFICE VISIT (OUTPATIENT)
Dept: INFECTIOUS DISEASES | Facility: CLINIC | Age: 69
End: 2019-11-06
Attending: INTERNAL MEDICINE
Payer: MEDICARE

## 2019-11-06 VITALS
BODY MASS INDEX: 22.56 KG/M2 | RESPIRATION RATE: 18 BRPM | WEIGHT: 157.6 LBS | DIASTOLIC BLOOD PRESSURE: 84 MMHG | HEIGHT: 70 IN | TEMPERATURE: 98.2 F | HEART RATE: 56 BPM | OXYGEN SATURATION: 97 % | SYSTOLIC BLOOD PRESSURE: 135 MMHG

## 2019-11-06 DIAGNOSIS — B39.9 HISTOPLASMOSIS: ICD-10-CM

## 2019-11-06 DIAGNOSIS — Z23 NEED FOR INFLUENZA VACCINATION: ICD-10-CM

## 2019-11-06 DIAGNOSIS — B39.9 HISTOPLASMOSIS: Primary | ICD-10-CM

## 2019-11-06 DIAGNOSIS — Z79.2 LONG TERM CURRENT USE OF ANTIBIOTICS: ICD-10-CM

## 2019-11-06 LAB
ALBUMIN SERPL-MCNC: 3.2 G/DL (ref 3.4–5)
ALP SERPL-CCNC: 137 U/L (ref 40–150)
ALT SERPL W P-5'-P-CCNC: 31 U/L (ref 0–70)
AST SERPL W P-5'-P-CCNC: 32 U/L (ref 0–45)
BILIRUB DIRECT SERPL-MCNC: 0.2 MG/DL (ref 0–0.2)
BILIRUB SERPL-MCNC: 0.5 MG/DL (ref 0.2–1.3)
PROT SERPL-MCNC: 7.3 G/DL (ref 6.8–8.8)
RADIOLOGIST FLAGS: NORMAL

## 2019-11-06 PROCEDURE — 80076 HEPATIC FUNCTION PANEL: CPT | Performed by: STUDENT IN AN ORGANIZED HEALTH CARE EDUCATION/TRAINING PROGRAM

## 2019-11-06 PROCEDURE — 36415 COLL VENOUS BLD VENIPUNCTURE: CPT | Performed by: STUDENT IN AN ORGANIZED HEALTH CARE EDUCATION/TRAINING PROGRAM

## 2019-11-06 PROCEDURE — G0463 HOSPITAL OUTPT CLINIC VISIT: HCPCS | Mod: ZF

## 2019-11-06 RX ORDER — GADOBUTROL 604.72 MG/ML
7.5 INJECTION INTRAVENOUS ONCE
Status: COMPLETED | OUTPATIENT
Start: 2019-11-06 | End: 2019-11-06

## 2019-11-06 RX ADMIN — GADOBUTROL 7.5 ML: 604.72 INJECTION INTRAVENOUS at 11:38

## 2019-11-06 ASSESSMENT — ENCOUNTER SYMPTOMS
EYE REDNESS: 0
DOUBLE VISION: 0
EYE WATERING: 0
EYE PAIN: 0
EYE IRRITATION: 0

## 2019-11-06 ASSESSMENT — PAIN SCALES - GENERAL: PAINLEVEL: NO PAIN (0)

## 2019-11-06 ASSESSMENT — MIFFLIN-ST. JEOR: SCORE: 1496.12

## 2019-11-06 NOTE — LETTER
"11/6/2019      RE: Haseeb Neal  [address]  [facility] [address]       [facility]  Infectious Disease Clinic Note:  Follow up     Patient:  Haseeb Neal, Date of birth 1950, Medical record number [id number]  Date of Visit:  11/06/2019         Assessment and Recommendations:     Recommendations:  - Ok to stop Itraconazole as planned, after 11/09/2019  - Advise to follow up with Ortho re AVN of biltaeral femur    Assessment:   Haseeb Neal is a 66 yo M here for f/u of anal histoplasma found on pathology after presenting with an ulcer and a perianal mass.     1. 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 Improved symptoms and histo antigen level improved since being on treatment with Itraconazole. Will stop Itraconazole at 12 months as planned. LFTs normal today.    2. History of recurrent fungal infections: Patient with history of fungal nail infections, possible esophageal candidiasis requiring longterm fluconazole. He also says he was told he had \"low t cells\" in the past. T cell subsets checked in 1/2019 showing only low CD4/CD8 ratio, otherwise normal. HIV and and immunoglobulins checked also found to be normal. It is possible his Prednisone had a role in his fungal infections.    3. Onychomycosis: Stopped at OSH due to elevated LFTs. Pt reports not being too bothered by his symptoms. Does not desire treatment at this time. LFTs normal today    4. AVN of bilateral femur: Incidental finding.Possibly also related to steroids. Advised pt to follow up with Ortho    Plan of care discussed with Staff ID Physician Dr Ricco Goodwin.     Carlos Bardales  PGY4 Infectious Diseases Fellow  Pager: [phone]        History of the Infectious Disease lllness:     Haseeb Neal is a 66 yo M who initially presented to [facility] with anal pain and mucous discharge for 6 months, and was found to have an ulcer and a mass, with concern for an infection vs neoplasm. On " 10/9/18 he had a biopsy of the anal mass, and pathology noted fungal organisms, compatible with histoplasmosis. He was referred to ID for follow up. We started him on Itraconazole on 11/09. BD Glucan was positive at 123. Histoplasma serum antigen was 0.83, urine at 0.5. When last seen in follow up on 1/11 and 4/10/2019, anal ulcer had improved in size with less discharge. Repeat histoplasma serum antigen was detected, but not quantifiable in 1/2019 and undetectable in 4/2019. Blastomyces antigen was also sent due to antibody positivity and possibility of cross reaction, also came back as detectable but not quantifiable in 1/2019. Plan was to continue Itraconazole for 12 months, till 11/9. Today, he returns for follow up. He has no pain or discharge anymore. Reports compliance with his itraconazole. Denies fevers, chills, sweats. F/u MRI done today.    We also started Terbinafine 250 mg daily for onychomycosis in 1/2019, with not much improvement noted. Was stopped at OSH per pt due to elevated LFTs.      He reported a cough, with past CXR in 11/2018 showing a hyperattenuating perihilar nodularity that may represent calcified granulomas. Repeat CXR was ordered, which he got done at OSH 2 days later and per records there was no s/o pneumonia. Since then, he has been treated with a course of azithromycin, a course of doxycycline and several prednisone courses for presumed COPD exacerbation at OSH. He has had two episodes of COPD exacerbations since last visit, admitted at [facility]. Now doing better. Per pulm visit showing clinical improvement.    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 He reported fungal infections of his nails for many years. He had difficulty swallowing in the past, which was thought to be due to a fungal infection, and therefore he had been on Fluconazole 100 mg daily, which was discontinued when itraconazole was started. T cell subsets checked in 1/2019 showing  only low CD4/CD8 ratio, otherwise normal. HIV and and immunoglobulins also found to be normal.    Review of Systems:  CONSTITUTIONAL:  No fevers or chills. No night sweats.  EYES: negative for icterus or acute vision changes.   ENT:  negative for hearing loss, tinnitus or sore throat  RESPIRATORY:  Cough with clear sputum, SOB  CARDIOVASCULAR:  negative for chest pain, heart palpitations  GASTROINTESTINAL:  negative for nausea, vomiting, diarrhea or constipation  GENITOURINARY:  negative for dysuria or hematuria.  HEME:  No easy bruising or bleeding  INTEGUMENT:  negative for rash or pruritus  NEURO:  Negative for headache or tremor.    Past Medical History:   Diagnosis Date     BPH (benign prostatic hyperplasia)      CAD (coronary artery disease)      COPD (chronic obstructive pulmonary disease) (H)      Histoplasmosis 1/11/2019     History of smoking      HTN (hypertension)      Hyperlipidemia      Old MI (myocardial infarction) 2006     PAD (peripheral artery disease) (H)      Presence of stent in coronary artery 2006     Past Surgical History:   Procedure Laterality Date     AAA REPAIR  04/2016    graft repair     APPENDECTOMY       GR II CORONARY STENT  2006     HERNIA REPAIR      x2     SIGMOIDOSCOPY FLEXIBLE N/A 10/9/2018    Procedure: SIGMOIDOSCOPY FLEXIBLE;  Exam Under Anesthesia, Flexible Sigmoidoscopy, Biopsies;  Surgeon: Radu Asher MD;  Location:  OR     Family History   Problem Relation Age of Onset     Unknown/Adopted Mother      Emphysema Father      Family History Negative Sister      Social History     Patient does not qualify to have social determinant information on file (likely too young).   Social History Narrative    L[place] in [facility]. 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, camping     Social History     Tobacco Use     Smoking status: Former Smoker     Packs/day: 1.50     Types: Cigarettes     Last attempt to quit: 04/2016     Years since quitting: 3.6     Smokeless tobacco:  "Never Used   Substance Use Topics     Alcohol use: Yes     Alcohol/week: 21.0 - 28.0 standard drinks     Types: 21 - 28 Cans of beer per week     Drug use: No     No outpatient medications have been marked as taking for the 11/6/19 encounter (Appointment) with Carlos Vega MD.     Allergies   Allergen Reactions     Penicillins Hives          Physical Exam:     /84 (BP Location: Right arm, Patient Position: Sitting, Cuff Size: Adult Regular)   Pulse 56   Temp 98.2  F (36.8  C) (Oral)   Resp 18   Ht 1.778 m (5' 10\")   Wt 71.5 kg (157 lb 9.6 oz)   SpO2 97%   BMI 22.61 kg/m     Exam:  GENERAL:  well-developed, well-nourished, alert, oriented, in no acute distress.  HEENT:  Head is normocephalic, atraumatic   EYES:  Eyes have anicteric sclerae.    ENT:  Oropharynx is moist without exudates or ulcers.  NECK:  Supple.  LUNGS:  Clear to auscultation.  CARDIOVASCULAR:  Regular rate and rhythm with no murmurs, gallops or rubs.  ABDOMEN:  Normal bowel sounds, soft, nontender.  SKIN: Yellowish discoloration and deformity of nails of left hand and both toes.   NEUROLOGIC:  Grossly nonfocal.         Laboratory Data:     Hepatic Studies    Recent Labs   Lab Test 11/06/19  1035 08/07/19  1411 04/10/19  1438   BILITOTAL 0.5 0.7 0.6   DBIL 0.2 0.2 0.2   ALKPHOS 137 94 109   PROTTOTAL 7.3 6.6* 6.7*   ALBUMIN 3.2* 3.2* 3.1*   AST 32 40 41   ALT 31 39 34     Iron Testing    Recent Labs   Lab Test 09/17/18  1540   MCV 91     Microbiology:  11/9/2018:  1,3 BD Glucan 123  Fungal antibodies: Aspergillus negative, Blastomyces positive, Histoplasma, Coccidiomyces negative  HIV: NR   Urine histoplasma antigen - 0.5  Blood histoplasma - 0.83  IgG 1630  IgA 644  IgM 116    T cell subsets:   Component Value Flag Ref Range Units Status Collected Lab   IFC Specimen Blood     Final 11/09/2018  4:21 PM 51   CD3 Mature T 82   49 - 84 % Final 11/09/2018  4:21 PM 51   CD4 [place] T 40   28 - 63 % Final 11/09/2018  4:21 PM 51   CD8 " Suppressor T 36   10 - 40 % Final 11/09/2018  4:21 PM 51   CD4:CD8 Ratio 1.11 Abnormally low   L  1.40 - 2.60  Final 11/09/2018  4:21 PM 51   Absolute CD3 1,673   603 - 2,990 cells/uL Final 11/09/2018  4:21 PM 51   Absolute CD4 815   441 - 2,156 cells/uL Final 11/09/2018  4:21 PM 51   Absolute CD8 740   125 - 1,312 cells/uL Final 11/09/2018  4:21 PM 5     Blood histoplasma antigen: positive but less than quantifiable 1/11/19  Blood blastomyces antigen: positive but less than quantifable 1/11/19    Imaging:  Results for orders placed or performed in visit on 09/28/18   MR Pelvis Musclular Tissue wo & w Contrast    Narrative    EXAMINATION: MR PELVIS MUSCULAR TISSUE WO & W CONTRAST,  9/28/2018 7:29 PM     COMPARISON: CT abdomen/pelvis without contrast 5/1/2016    TECHNIQUE:  Images were acquired without and with intravenous contrast  through the pelvis. The following MR images were acquired without  contrast: multiplanar T1, multiplanar T2, axial diffusion-weighted and  axial apparent diffusion coefficient. T1-weighted images with fat  saturation were acquired at the following intervals relative to  intravenous contrast administration: pre-contrast, immediate post  contrast, 1 minute, 3 minutes and delayed.  Contrast dose: 7.5mL  Gadavist, 0.5 glucogon    HISTORY: ; Rectal prolapse; Rectal ulcer; Anal pain    FINDINGS:    LOCATION/SIZE: In the right posterolateral anus extending from the  internal sphincter through the external sphincter to the anal verge,  there is a 4.3 cm mass with intermediate T2 signal intensity,  relatively homogeneous enhancement, and restricted diffusion (series 6  images 42-47).    EXTENT: The tumor involves the internal and external sphincters. No  involvement of the levator ani musculature.  Inferiorly, the mass  involves the right medial intergluteal cleft.    LYMPH NODES: No overtly suspicious pelvic lymphadenopathy. There are  scattered small pelvic lymph nodes, for example a 7 mm right  iliac  chain lymph node (series 6 image 16) and a 3 mm lymph node in the  right posterior mesial rectal fat (series 6 image 23).     MISCELLANEOUS FINDINGS:  Multiple fluid-filled bladder diverticula. There is sigmoid  diverticulosis. Changes of aortobifemoral graft placement with patent  graft and occluded native iliac vessels.  In the posterior  subcutaneous tissues, in the coccyx there is a 1.2 x 0.7 cm ovoid  enhancing nodule (series 15 image 49). This has decreased T2 signal  suggesting prior infectious or traumatic etiology without findings to  suggest malignancy.         Impression    IMPRESSION:  4.3 cm mass in the right posterolateral anus extending  from the internal sphincter through the external sphincter to the  right gluteal fold. The mass also approaches the puborectalis  posteriorly in the midline. Findings are concerning for malignancy.    I have personally reviewed the examination and initial interpretation  and I agree with the findings.    CLARENCE HERNANDEZ MD     CXR 11/9:    IMPRESSION:   1.  Hyperattenuating perihilar nodularity, may represent calcified  granulomas.  2.  No acute airspace disease.    Pathology 10/9/18  FINAL DIAGNOSIS:   A. PERIANAL SKIN, RIGHT LATERAL, BIOPSY:   - Ulcerated perianal skin with adjacent pseudoepitheliomatous hyperplasia,    granuloma formation, marked chronic   inflammation   - Fungal organisms morphologically compatible with histoplasmosis, see   comment   - Negative for malignancy     B. RIGHT ANAL MASS, BIOPSY:   - Scant fibroadipose tissue   - Negative for malignancy     COMMENT:   Preliminary diagnosis given to Dr. Radu Asher by Dr. Virginia Trevino    on 10/11/18 at 13:49.  GMS stain   confirms the presence of fungal organisms (yeast forms), PAS stain   highlights the organism as well.  Erin stain is negative for AFB.     11/6 MRI Pelvis  IMPRESSION:   1. Near complete resolution of previously noted biopsy confirmed  enhancing histoplasmosis  infection the right lateral perianal region,  now left only residual fibrosis in the lesion site.  2. Bilateral new AVN of the femoral heads.    Infectious Disease Clinic Staff Note: Mr. Neal was seen, examined, and the case was discussed with Dr. Dinh, ID Fellow -- I agree with her interval history and examination, assessment and plan in this outpatient ID Progress note. This note reflects my observations and opinions and the plan outlined fully reflects my approach. I have reviewed the available history, radiology, laboratory results, and reports with the Fellow.      Carlos Vega MD

## 2019-11-06 NOTE — DISCHARGE INSTRUCTIONS
MRI Contrast Discharge Instructions    The IV contrast you received today will pass out of your body in your  urine. This will happen in the next 24 hours. You will not feel this process.  Your urine will not change color.    Drink at least 4 extra glasses of water or juice today (unless your doctor  has restricted your fluids). This reduces the stress on your kidneys.  You may take your regular medicines.    If you are on dialysis: It is best to have dialysis today.    If you have a reaction: Most reactions happen right away. If you have  any new symptoms after leaving the hospital (such as hives or swelling),  call your hospital at the correct number below. Or call your family doctor.  If you have breathing distress or wheezing, call 911.    Special instructions: ***    I have read and understand the above information.    Signature:______________________________________ Date:___________    Staff:__________________________________________ Date:___________     Time:__________    [place] [facility] Departments:    ___[place]: [phone]  ___[facility]: [phone]  ___[place]: [phone] ___[facility]: [phone]  ___[facility]: [phone]  ___[facility]: [phone]  ___[phone]  ___[facility]: [phone]  ___[phone]

## 2019-11-06 NOTE — PROGRESS NOTES
"[facility]  Infectious Disease Clinic Note:  Follow up     Patient:  Haseeb Neal, Date of birth 1950, Medical record number [id number]  Date of Visit:  11/06/2019         Assessment and Recommendations:     Recommendations:  - Ok to stop Itraconazole as planned, after 11/09/2019  - Advise to follow up with Ortho re AVN of biltaeral femur    Assessment:   Haseeb Neal is a 68 yo M here for f/u of anal histoplasma found on pathology after presenting with an ulcer and a perianal mass.     1. 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 Improved symptoms and histo antigen level improved since being on treatment with Itraconazole. Will stop Itraconazole at 12 months as planned. LFTs normal today.    2. History of recurrent fungal infections: Patient with history of fungal nail infections, possible esophageal candidiasis requiring longterm fluconazole. He also says he was told he had \"low t cells\" in the past. T cell subsets checked in 1/2019 showing only low CD4/CD8 ratio, otherwise normal. HIV and and immunoglobulins checked also found to be normal. It is possible his Prednisone had a role in his fungal infections.    3. Onychomycosis: Stopped at OSH due to elevated LFTs. Pt reports not being too bothered by his symptoms. Does not desire treatment at this time. LFTs normal today    4. AVN of bilateral femur: Incidental finding.Possibly also related to steroids. Advised pt to follow up with Ortho    Plan of care discussed with Staff ID Physician Dr Ricco Goodwin.     Carlos Bardales  PGY4 Infectious Diseases Fellow  Pager: [phone]        History of the Infectious Disease lllness:     Haseeb Neal is a 68 yo M who initially presented to [facility] with anal pain and mucous discharge for 6 months, and was found to have an ulcer and a mass, with concern for an infection vs neoplasm. On 10/9/18 he had a biopsy of the anal mass, and pathology noted fungal organisms, " compatible with histoplasmosis. He was referred to ID for follow up. We started him on Itraconazole on 11/09. BD Glucan was positive at 123. Histoplasma serum antigen was 0.83, urine at 0.5. When last seen in follow up on 1/11 and 4/10/2019, anal ulcer had improved in size with less discharge. Repeat histoplasma serum antigen was detected, but not quantifiable in 1/2019 and undetectable in 4/2019. Blastomyces antigen was also sent due to antibody positivity and possibility of cross reaction, also came back as detectable but not quantifiable in 1/2019. Plan was to continue Itraconazole for 12 months, till 11/9. Today, he returns for follow up. He has no pain or discharge anymore. Reports compliance with his itraconazole. Denies fevers, chills, sweats. F/u MRI done today.    We also started Terbinafine 250 mg daily for onychomycosis in 1/2019, with not much improvement noted. Was stopped at OSH per pt due to elevated LFTs.      He reported a cough, with past CXR in 11/2018 showing a hyperattenuating perihilar nodularity that may represent calcified granulomas. Repeat CXR was ordered, which he got done at OSH 2 days later and per records there was no s/o pneumonia. Since then, he has been treated with a course of azithromycin, a course of doxycycline and several prednisone courses for presumed COPD exacerbation at OSH. He has had two episodes of COPD exacerbations since last visit, admitted at [facility]. Now doing better. Per pulm visit showing clinical improvement.    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 He reported fungal infections of his nails for many years. He had difficulty swallowing in the past, which was thought to be due to a fungal infection, and therefore he had been on Fluconazole 100 mg daily, which was discontinued when itraconazole was started. T cell subsets checked in 1/2019 showing only low CD4/CD8 ratio, otherwise normal. HIV and and immunoglobulins also found  to be normal.    Review of Systems:  CONSTITUTIONAL:  No fevers or chills. No night sweats.  EYES: negative for icterus or acute vision changes.   ENT:  negative for hearing loss, tinnitus or sore throat  RESPIRATORY:  Cough with clear sputum, SOB  CARDIOVASCULAR:  negative for chest pain, heart palpitations  GASTROINTESTINAL:  negative for nausea, vomiting, diarrhea or constipation  GENITOURINARY:  negative for dysuria or hematuria.  HEME:  No easy bruising or bleeding  INTEGUMENT:  negative for rash or pruritus  NEURO:  Negative for headache or tremor.    Past Medical History:   Diagnosis Date     BPH (benign prostatic hyperplasia)      CAD (coronary artery disease)      COPD (chronic obstructive pulmonary disease) (H)      Histoplasmosis 1/11/2019     History of smoking      HTN (hypertension)      Hyperlipidemia      Old MI (myocardial infarction) 2006     PAD (peripheral artery disease) (H)      Presence of stent in coronary artery 2006     Past Surgical History:   Procedure Laterality Date     AAA REPAIR  04/2016    graft repair     APPENDECTOMY       GR II CORONARY STENT  2006     HERNIA REPAIR      x2     SIGMOIDOSCOPY FLEXIBLE N/A 10/9/2018    Procedure: SIGMOIDOSCOPY FLEXIBLE;  Exam Under Anesthesia, Flexible Sigmoidoscopy, Biopsies;  Surgeon: Radu Asher MD;  Location:  OR     Family History   Problem Relation Age of Onset     Unknown/Adopted Mother      Emphysema Father      Family History Negative Sister      Social History     Patient does not qualify to have social determinant information on file (likely too young).   Social History Narrative    Lives in [facility]. 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, camping     Social History     Tobacco Use     Smoking status: Former Smoker     Packs/day: 1.50     Types: Cigarettes     Last attempt to quit: 04/2016     Years since quitting: 3.6     Smokeless tobacco: Never Used   Substance Use Topics     Alcohol use: Yes     Alcohol/week: 21.0 - 28.0  "standard drinks     Types: 21 - 28 Cans of beer per week     Drug use: No     No outpatient medications have been marked as taking for the 11/6/19 encounter (Appointment) with Carlos Vega MD.     Allergies   Allergen Reactions     Penicillins Hives          Physical Exam:     /84 (BP Location: Right arm, Patient Position: Sitting, Cuff Size: Adult Regular)   Pulse 56   Temp 98.2  F (36.8  C) (Oral)   Resp 18   Ht 1.778 m (5' 10\")   Wt 71.5 kg (157 lb 9.6 oz)   SpO2 97%   BMI 22.61 kg/m    Exam:  GENERAL:  well-developed, well-nourished, alert, oriented, in no acute distress.  HEENT:  Head is normocephalic, atraumatic   EYES:  Eyes have anicteric sclerae.    ENT:  Oropharynx is moist without exudates or ulcers.  NECK:  Supple.  LUNGS:  Clear to auscultation.  CARDIOVASCULAR:  Regular rate and rhythm with no murmurs, gallops or rubs.  ABDOMEN:  Normal bowel sounds, soft, nontender.  SKIN: Yellowish discoloration and deformity of nails of left hand and both toes.   NEUROLOGIC:  Grossly nonfocal.         Laboratory Data:     Hepatic Studies    Recent Labs   Lab Test 11/06/19  1035 08/07/19  1411 04/10/19  1438   BILITOTAL 0.5 0.7 0.6   DBIL 0.2 0.2 0.2   ALKPHOS 137 94 109   PROTTOTAL 7.3 6.6* 6.7*   ALBUMIN 3.2* 3.2* 3.1*   AST 32 40 41   ALT 31 39 34     Iron Testing    Recent Labs   Lab Test 09/17/18  1540   MCV 91     Microbiology:  11/9/2018:  1,3 BD Glucan 123  Fungal antibodies: Aspergillus negative, Blastomyces positive, Histoplasma, Coccidiomyces negative  HIV: NR   Urine histoplasma antigen - 0.5  Blood histoplasma - 0.83  IgG 1630  IgA 644  IgM 116    T cell subsets:   Component Value Flag Ref Range Units Status Collected Lab   IFC Specimen Blood     Final 11/09/2018  4:21 PM 51   CD3 Mature T 82   49 - 84 % Final 11/09/2018  4:21 PM 51   CD4 Mcfaddin T 40   28 - 63 % Final 11/09/2018  4:21 PM 51   CD8 Suppressor T 36   10 - 40 % Final 11/09/2018  4:21 PM 51   CD4:CD8 Ratio 1.11 Abnormally " low   L  1.40 - 2.60  Final 11/09/2018  4:21 PM 51   Absolute CD3 1,673   603 - 2,990 cells/uL Final 11/09/2018  4:21 PM 51   Absolute CD4 815   441 - 2,156 cells/uL Final 11/09/2018  4:21 PM 51   Absolute CD8 740   125 - 1,312 cells/uL Final 11/09/2018  4:21 PM 5     Blood histoplasma antigen: positive but less than quantifiable 1/11/19  Blood blastomyces antigen: positive but less than quantifable 1/11/19    Imaging:  Results for orders placed or performed in visit on 09/28/18   MR Pelvis Musclular Tissue wo & w Contrast    Narrative    EXAMINATION: MR PELVIS MUSCULAR TISSUE WO & W CONTRAST,  9/28/2018 7:29 PM     COMPARISON: CT abdomen/pelvis without contrast 5/1/2016    TECHNIQUE:  Images were acquired without and with intravenous contrast  through the pelvis. The following MR images were acquired without  contrast: multiplanar T1, multiplanar T2, axial diffusion-weighted and  axial apparent diffusion coefficient. T1-weighted images with fat  saturation were acquired at the following intervals relative to  intravenous contrast administration: pre-contrast, immediate post  contrast, 1 minute, 3 minutes and delayed.  Contrast dose: 7.5mL  Gadavist, 0.5 glucogon    HISTORY: ; Rectal prolapse; Rectal ulcer; Anal pain    FINDINGS:    LOCATION/SIZE: In the right posterolateral anus extending from the  internal sphincter through the external sphincter to the anal verge,  there is a 4.3 cm mass with intermediate T2 signal intensity,  relatively homogeneous enhancement, and restricted diffusion (series 6  images 42-47).    EXTENT: The tumor involves the internal and external sphincters. No  involvement of the levator ani musculature.  Inferiorly, the mass  involves the right medial intergluteal cleft.    LYMPH NODES: No overtly suspicious pelvic lymphadenopathy. There are  scattered small pelvic lymph nodes, for example a 7 mm right iliac  chain lymph node (series 6 image 16) and a 3 mm lymph node in the  right posterior  mesial rectal fat (series 6 image 23).     MISCELLANEOUS FINDINGS:  Multiple fluid-filled bladder diverticula. There is sigmoid  diverticulosis. Changes of aortobifemoral graft placement with patent  graft and occluded native iliac vessels.  In the posterior  subcutaneous tissues, in the coccyx there is a 1.2 x 0.7 cm ovoid  enhancing nodule (series 15 image 49). This has decreased T2 signal  suggesting prior infectious or traumatic etiology without findings to  suggest malignancy.         Impression    IMPRESSION:  4.3 cm mass in the right posterolateral anus extending  from the internal sphincter through the external sphincter to the  right gluteal fold. The mass also approaches the puborectalis  posteriorly in the midline. Findings are concerning for malignancy.    I have personally reviewed the examination and initial interpretation  and I agree with the findings.    CLARENCE HERNANDEZ MD     CXR 11/9:    IMPRESSION:   1.  Hyperattenuating perihilar nodularity, may represent calcified  granulomas.  2.  No acute airspace disease.    Pathology 10/9/18  FINAL DIAGNOSIS:   A. PERIANAL SKIN, RIGHT LATERAL, BIOPSY:   - Ulcerated perianal skin with adjacent pseudoepitheliomatous hyperplasia,    granuloma formation, marked chronic   inflammation   - Fungal organisms morphologically compatible with histoplasmosis, see   comment   - Negative for malignancy     B. RIGHT ANAL MASS, BIOPSY:   - Scant fibroadipose tissue   - Negative for malignancy     COMMENT:   Preliminary diagnosis given to Dr. Radu Asher by Dr. Virginia Trevino    on 10/11/18 at 13:49.  GMS stain   confirms the presence of fungal organisms (yeast forms), PAS stain   highlights the organism as well.  Erin stain is negative for AFB.     11/6 MRI Pelvis  IMPRESSION:   1. Near complete resolution of previously noted biopsy confirmed  enhancing histoplasmosis infection the right lateral perianal region,  now left only residual fibrosis in the lesion  site.  2. Bilateral new AVN of the femoral heads.

## 2019-11-06 NOTE — NURSING NOTE
"Chief Complaint   Patient presents with     RECHECK     Histoplasmosis       Vital signs:  Temp: 98.2  F (36.8  C) Temp src: Oral BP: 135/84 Pulse: 56   Resp: 18 SpO2: 97 %     Height: 177.8 cm (5' 10\") Weight: 71.5 kg (157 lb 9.6 oz)  Estimated body mass index is 22.61 kg/m  as calculated from the following:    Height as of this encounter: 1.778 m (5' 10\").    Weight as of this encounter: 71.5 kg (157 lb 9.6 oz).          Gabrielle Hoffman, [facility]  11/6/2019 12:54 PM      "

## 2019-11-06 NOTE — LETTER
"11/6/2019       RE: Haseeb Neal  [address]  [facility] [address]     Dear Colleague,    Thank you for referring your patient, Haseeb Neal, to the [facility] AND INFECTIOUS DISEASES at [facility]. Please see a copy of my visit note below.    [facility]  Infectious Disease Clinic Note:  Follow up     Patient:  Haseeb Neal, Date of birth 1950, Medical record number [id number]  Date of Visit:  11/06/2019         Assessment and Recommendations:     Recommendations:  - Ok to stop Itraconazole as planned, after 11/09/2019  - Advise to follow up with Ortho re AVN of biltaeral femur    Assessment:   Haseeb Neal is a 68 yo M here for f/u of anal histoplasma found on pathology after presenting with an ulcer and a perianal mass.     1. 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 Improved symptoms and histo antigen level improved since being on treatment with Itraconazole. Will stop Itraconazole at 12 months as planned. LFTs normal today.    2. History of recurrent fungal infections: Patient with history of fungal nail infections, possible esophageal candidiasis requiring longterm fluconazole. He also says he was told he had \"low t cells\" in the past. T cell subsets checked in 1/2019 showing only low CD4/CD8 ratio, otherwise normal. HIV and and immunoglobulins checked also found to be normal. It is possible his Prednisone had a role in his fungal infections.    3. Onychomycosis: Stopped at OSH due to elevated LFTs. Pt reports not being too bothered by his symptoms. Does not desire treatment at this time. LFTs normal today    4. AVN of bilateral femur: Incidental finding.Possibly also related to steroids. Advised pt to follow up with Ortho    Plan of care discussed with Staff ID Physician Dr Ricco Goodwin.     Carlos Bardales  PGY4 Infectious Diseases Fellow  Pager: [phone]        History of the " Infectious Disease lllness:     Haseeb Neal is a 66 yo M who initially presented to [facility] with anal pain and mucous discharge for 6 months, and was found to have an ulcer and a mass, with concern for an infection vs neoplasm. On 10/9/18 he had a biopsy of the anal mass, and pathology noted fungal organisms, compatible with histoplasmosis. He was referred to ID for follow up. We started him on Itraconazole on 11/09. BD Glucan was positive at 123. Histoplasma serum antigen was 0.83, urine at 0.5. When last seen in follow up on 1/11 and 4/10/2019, anal ulcer had improved in size with less discharge. Repeat histoplasma serum antigen was detected, but not quantifiable in 1/2019 and undetectable in 4/2019. Blastomyces antigen was also sent due to antibody positivity and possibility of cross reaction, also came back as detectable but not quantifiable in 1/2019. Plan was to continue Itraconazole for 12 months, till 11/9. Today, he returns for follow up. He has no pain or discharge anymore. Reports compliance with his itraconazole. Denies fevers, chills, sweats. F/u MRI done today.    We also started Terbinafine 250 mg daily for onychomycosis in 1/2019, with not much improvement noted. Was stopped at OSH per pt due to elevated LFTs.      He reported a cough, with past CXR in 11/2018 showing a hyperattenuating perihilar nodularity that may represent calcified granulomas. Repeat CXR was ordered, which he got done at OSH 2 days later and per records there was no s/o pneumonia. Since then, he has been treated with a course of azithromycin, a course of doxycycline and several prednisone courses for presumed COPD exacerbation at OSH. He has had two episodes of COPD exacerbations since last visit, admitted at [facility]. Now doing better. Per pulm visit showing clinical improvement.    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 He reported fungal infections of his nails for many years. He had  difficulty swallowing in the past, which was thought to be due to a fungal infection, and therefore he had been on Fluconazole 100 mg daily, which was discontinued when itraconazole was started. T cell subsets checked in 1/2019 showing only low CD4/CD8 ratio, otherwise normal. HIV and and immunoglobulins also found to be normal.    Review of Systems:  CONSTITUTIONAL:  No fevers or chills. No night sweats.  EYES: negative for icterus or acute vision changes.   ENT:  negative for hearing loss, tinnitus or sore throat  RESPIRATORY:  Cough with clear sputum, SOB  CARDIOVASCULAR:  negative for chest pain, heart palpitations  GASTROINTESTINAL:  negative for nausea, vomiting, diarrhea or constipation  GENITOURINARY:  negative for dysuria or hematuria.  HEME:  No easy bruising or bleeding  INTEGUMENT:  negative for rash or pruritus  NEURO:  Negative for headache or tremor.    Past Medical History:   Diagnosis Date     BPH (benign prostatic hyperplasia)      CAD (coronary artery disease)      COPD (chronic obstructive pulmonary disease) (H)      Histoplasmosis 1/11/2019     History of smoking      HTN (hypertension)      Hyperlipidemia      Old MI (myocardial infarction) 2006     PAD (peripheral artery disease) (H)      Presence of stent in coronary artery 2006     Past Surgical History:   Procedure Laterality Date     AAA REPAIR  04/2016    graft repair     APPENDECTOMY       GR II CORONARY STENT  2006     HERNIA REPAIR      x2     SIGMOIDOSCOPY FLEXIBLE N/A 10/9/2018    Procedure: SIGMOIDOSCOPY FLEXIBLE;  Exam Under Anesthesia, Flexible Sigmoidoscopy, Biopsies;  Surgeon: Radu Asher MD;  Location:  OR     Family History   Problem Relation Age of Onset     Unknown/Adopted Mother      Emphysema Father      Family History Negative Sister      Social History     Patient does not qualify to have social determinant information on file (likely too young).   Social History Narrative    Lives in [facility].  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     Allergies   Allergen Reactions     Penicillins Hives          Physical Exam:     /84 (BP Location: Right arm, Patient Position: Sitting, Cuff Size: Adult Regular)   Pulse 56   Temp 98.2  F (36.8  C) (Oral)   Resp 18   Ht 1.778 m (5' 10\")   Wt 71.5 kg (157 lb 9.6 oz)   SpO2 97%   BMI 22.61 kg/m     Exam:  GENERAL:  well-developed, well-nourished, alert, oriented, in no acute distress.  HEENT:  Head is normocephalic, atraumatic   EYES:  Eyes have anicteric sclerae.    ENT:  Oropharynx is moist without exudates or ulcers.  NECK:  Supple.  LUNGS:  Clear to auscultation.  CARDIOVASCULAR:  Regular rate and rhythm with no murmurs, gallops or rubs.  ABDOMEN:  Normal bowel sounds, soft, nontender.  SKIN: Yellowish discoloration and deformity of nails of left hand and both toes.   NEUROLOGIC:  Grossly nonfocal.         Laboratory Data:     Hepatic Studies    Recent Labs   Lab Test 11/06/19  1035 08/07/19  1411 04/10/19  1438   BILITOTAL 0.5 0.7 0.6   DBIL 0.2 0.2 0.2   ALKPHOS 137 94 109   PROTTOTAL 7.3 6.6* 6.7*   ALBUMIN 3.2* 3.2* 3.1*   AST 32 40 41   ALT 31 39 34     Iron Testing    Recent Labs   Lab Test 09/17/18  1540   MCV 91     Microbiology:  11/9/2018:  1,3 BD Glucan 123  Fungal antibodies: Aspergillus negative, Blastomyces positive, Histoplasma, Coccidiomyces negative  HIV: NR   Urine histoplasma antigen - 0.5  Blood histoplasma - 0.83  IgG 1630  IgA 644  IgM 116    T cell subsets: "   Component Value Flag Ref Range Units Status Collected Lab   IFC Specimen Blood     Final 11/09/2018  4:21 PM 51   CD3 Mature T 82   49 - 84 % Final 11/09/2018  4:21 PM 51   CD4 [place] T 40   28 - 63 % Final 11/09/2018  4:21 PM 51   CD8 Suppressor T 36   10 - 40 % Final 11/09/2018  4:21 PM 51   CD4:CD8 Ratio 1.11 Abnormally low   L  1.40 - 2.60  Final 11/09/2018  4:21 PM 51   Absolute CD3 1,673   603 - 2,990 cells/uL Final 11/09/2018  4:21 PM 51   Absolute CD4 815   441 - 2,156 cells/uL Final 11/09/2018  4:21 PM 51   Absolute CD8 740   125 - 1,312 cells/uL Final 11/09/2018  4:21 PM 5     Blood histoplasma antigen: positive but less than quantifiable 1/11/19  Blood blastomyces antigen: positive but less than quantifable 1/11/19    Imaging:  Results for orders placed or performed in visit on 09/28/18   MR Pelvis Musclular Tissue wo & w Contrast    Narrative    EXAMINATION: MR PELVIS MUSCULAR TISSUE WO & W CONTRAST,  9/28/2018 7:29 PM     COMPARISON: CT abdomen/pelvis without contrast 5/1/2016    TECHNIQUE:  Images were acquired without and with intravenous contrast  through the pelvis. The following MR images were acquired without  contrast: multiplanar T1, multiplanar T2, axial diffusion-weighted and  axial apparent diffusion coefficient. T1-weighted images with fat  saturation were acquired at the following intervals relative to  intravenous contrast administration: pre-contrast, immediate post  contrast, 1 minute, 3 minutes and delayed.  Contrast dose: 7.5mL  Gadavist, 0.5 glucogon    HISTORY: ; Rectal prolapse; Rectal ulcer; Anal pain    FINDINGS:    LOCATION/SIZE: In the right posterolateral anus extending from the  internal sphincter through the external sphincter to the anal verge,  there is a 4.3 cm mass with intermediate T2 signal intensity,  relatively homogeneous enhancement, and restricted diffusion (series 6  images 42-47).    EXTENT: The tumor involves the internal and external sphincters.  No  involvement of the levator ani musculature.  Inferiorly, the mass  involves the right medial intergluteal cleft.    LYMPH NODES: No overtly suspicious pelvic lymphadenopathy. There are  scattered small pelvic lymph nodes, for example a 7 mm right iliac  chain lymph node (series 6 image 16) and a 3 mm lymph node in the  right posterior mesial rectal fat (series 6 image 23).     MISCELLANEOUS FINDINGS:  Multiple fluid-filled bladder diverticula. There is sigmoid  diverticulosis. Changes of aortobifemoral graft placement with patent  graft and occluded native iliac vessels.  In the posterior  subcutaneous tissues, in the coccyx there is a 1.2 x 0.7 cm ovoid  enhancing nodule (series 15 image 49). This has decreased T2 signal  suggesting prior infectious or traumatic etiology without findings to  suggest malignancy.         Impression    IMPRESSION:  4.3 cm mass in the right posterolateral anus extending  from the internal sphincter through the external sphincter to the  right gluteal fold. The mass also approaches the puborectalis  posteriorly in the midline. Findings are concerning for malignancy.    I have personally reviewed the examination and initial interpretation  and I agree with the findings.    CLARENCE HERNANDEZ MD     CXR 11/9:    IMPRESSION:   1.  Hyperattenuating perihilar nodularity, may represent calcified  granulomas.  2.  No acute airspace disease.    Pathology 10/9/18  FINAL DIAGNOSIS:   A. PERIANAL SKIN, RIGHT LATERAL, BIOPSY:   - Ulcerated perianal skin with adjacent pseudoepitheliomatous hyperplasia,    granuloma formation, marked chronic   inflammation   - Fungal organisms morphologically compatible with histoplasmosis, see   comment   - Negative for malignancy     B. RIGHT ANAL MASS, BIOPSY:   - Scant fibroadipose tissue   - Negative for malignancy     COMMENT:   Preliminary diagnosis given to Dr. Radu Asher by Dr. Virginia Trevino    on 10/11/18 at 13:49.  GMS stain   confirms the  presence of fungal organisms (yeast forms), PAS stain   highlights the organism as well.  Erin stain is negative for AFB.     11/6 MRI Pelvis  IMPRESSION:   1. Near complete resolution of previously noted biopsy confirmed  enhancing histoplasmosis infection the right lateral perianal region,  now left only residual fibrosis in the lesion site.  2. Bilateral new AVN of the femoral heads.    Infectious Disease Clinic Staff Note: Mr. Neal was seen, examined, and the case was discussed with Dr. Dinh, ID Fellow -- I agree with her interval history and examination, assessment and plan in this outpatient ID Progress note. This note reflects my observations and opinions and the plan outlined fully reflects my approach. I have reviewed the available history, radiology, laboratory results, and reports with the Fellow.    Again, thank you for allowing me to participate in the care of your patient.      Sincerely,    Carlos Vega MD

## 2019-11-06 NOTE — Clinical Note
11/6/2019       RE: Haseeb Neal  [address]  [facility] [address]     Dear Colleague,    Thank you for referring your patient, Haseeb Neal, to the [facility] AND INFECTIOUS DISEASES at [facility]. Please see a copy of my visit note below.    [facility]  Infectious Disease Clinic Note:  Follow up     Patient:  Haseeb Neal, Date of birth 1950, Medical record number [id number]  Date of Visit:  11/06/2019  Consult requested by Dr. 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     1. Anal/rectal histoplasmosis:  Improved symptoms and histo antigen level improved since being on treatment with Itraconazole. Differential for fungal organisms on path are most likely histo, but could also be blasto. Histoplasma and Blastomyces assays are sometimes mistaken for each other. Serum histo and Blasto antigens were positive in 1/2019, but lower than quantifiable. Repeat Histoplasma antigen undetectable in 4/2019. In light of his reported past history of immunosuppression and having been on Prednisone, will likely treat for 12 months, which is to end around 11/09/2019. Would also repeat MRI Pelvis to determine progress of anal mass. LFTs being followed monthly by PCP, last in July with mild elevation (scanned), will repeat today.     2. History of recurrent fungal  "infections: Patient with history of fungal nail infections, possible esophageal candidiasis requiring longterm fluconazole. He also says he was told he had \"low t cells\" in the past. T cell subsets checked in 1/2019t showing only low CD4/CD8 ratio, otherwise normal. Will plan to repeat in the future. HIV and and immunoglobulins checked also found to be normal.    3. Onychomycosis: Stopped at OSH due to elevated LFTs. Pt reports not being too bothered by his symptoms. Will hold for now while on Itraconazole.     Plan of care discussed with Staff ID Physician Dr Ricco Goodwin.     Carlos Bardales  PGY4 Infectious Diseases Fellow  Pager: [phone]        History of the Infectious Disease lllness:     Haseeb Neal is a 68 yo M who initially presented to [facility] with anal pain and mucous discharge for 6 months, and was found to have an ulcer and a mass, with concern for an infection vs neoplasm. On 10/9/18 he had a biopsy of the anal mass, and pathology noted fungal organisms, compatible with histoplasmosis. He was referred to ID for follow up. We started him on Itraconazole on 11/09. BD Glucan was positive at 123. Histoplasma serum antigen was 0.83, urine at 0.5. When last seen in follow up on 1/11 and 4/10/2019, anal ulcer had improved in size with less discharge. Repeat histoplasma serum antigen was detected, but not quantifiable in 1/2019 and undetectable in 4/2019. Blastomyces antigen was also sent due to antibody positivity and possibility of cross reaction, also came back as detectable but not quantifiable in 1/2019. Today, he returns for follow up. He has no pain or discharge anymore. Reports compliance with his itraconazole. Denies fevers, chills, sweats.    We also started Terbinafine 250 mg daily for onychomycosis in 1/2019, with not much improvement noted. Was stopped at OSH per pt due to elevated LFTs.     He reported a cough, with past CXR in 11/2018 showing a hyperattenuating perihilar nodularity that may " represent calcified granulomas. Repeat CXR was ordered, which he got done at OSH 2 days later and per records there was no s/o pneumonia. Since then, he has been treated with a course of azithromycin, a course of doxycycline and several prednisone courses for presumed COPD exacerbation at OSH. He has had two episodes of COPD exacerbations since last visit, admitted at [facility]. Now doing better. Pulm visit upcoming.    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 He reported fungal infections of his nails for many years. He had difficulty swallowing in the past, which was thought to be due to a fungal infection, and therefore he had been on Fluconazole 100 mg daily, which was discontinued when itraconazole was started. T cell subsets checked in 1/2019 showing only low CD4/CD8 ratio, otherwise normal. HIV and and immunoglobulins also found to be normal.    Review of Systems:  CONSTITUTIONAL:  No fevers or chills. No night sweats.  EYES: negative for icterus or acute vision changes.   ENT:  negative for hearing loss, tinnitus or sore throat  RESPIRATORY:  Cough with clear sputum, SOB  CARDIOVASCULAR:  negative for chest pain, heart palpitations  GASTROINTESTINAL:  negative for nausea, vomiting, diarrhea or constipation  GENITOURINARY:  negative for dysuria or hematuria.  HEME:  No easy bruising or bleeding  INTEGUMENT:  negative for rash or pruritus  NEURO:  Negative for headache or tremor.    Past Medical History:   Diagnosis Date     BPH (benign prostatic hyperplasia)      CAD (coronary artery disease)      COPD (chronic obstructive pulmonary disease) (H)      Histoplasmosis 1/11/2019     History of smoking      HTN (hypertension)      Hyperlipidemia      Old MI (myocardial infarction) 2006     PAD (peripheral artery disease) (H)      Presence of stent in coronary artery 2006     Past Surgical History:   Procedure Laterality Date     AAA REPAIR  04/2016    graft repair     APPENDECTOMY        GR II CORONARY STENT  2006     HERNIA REPAIR      x2     SIGMOIDOSCOPY FLEXIBLE N/A 10/9/2018    Procedure: SIGMOIDOSCOPY FLEXIBLE;  Exam Under Anesthesia, Flexible Sigmoidoscopy, Biopsies;  Surgeon: Radu Asher MD;  Location:  OR     Family History   Problem Relation Age of Onset     Unknown/Adopted Mother      Emphysema Father      Family History Negative Sister      Social History     Patient does not qualify to have social determinant information on file (likely too young).   Social History Narrative    L[facility] in [facility]. 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     Allergies   Allergen Reactions     Penicillins Hives          Physical Exam:   Vitals were reviewed.  All vitals stable  There were no vitals taken for this visit.    Exam:  GENERAL:  well-developed, well-nourished, alert, oriented, in no acute distress.  HEENT:  Head is normocephalic, atraumatic   EYES:  Eyes have anicteric sclerae.    ENT:  Oropharynx is moist without exudates or ulcers.  NECK:  Supple.  LUNGS:  Clear to auscultation.  CARDIOVASCULAR:  Regular rate and rhythm with no murmurs, gallops or rubs.  ABDOMEN:  Normal bowel sounds, soft, nontender.  SKIN: Perianal ulcer resolved, no erythema, warmth, fluctuance, discharge. Yellowish discoloration and deformity of nails of left hand and both toes.   NEUROLOGIC:  Grossly nonfocal.         Laboratory Data:     Hepatic Studies    Recent Labs   Lab Test 11/06/19  1035 08/07/19  1411  04/10/19  1438   BILITOTAL 0.5 0.7 0.6   DBIL 0.2 0.2 0.2   ALKPHOS 137 94 109   PROTTOTAL 7.3 6.6* 6.7*   ALBUMIN 3.2* 3.2* 3.1*   AST 32 40 41   ALT 31 39 34     Iron Testing    Recent Labs   Lab Test 09/17/18  1540   MCV 91     Microbiology:  11/9/2018:  1,3 BD Glucan 123  Fungal antibodies: Aspergillus negative, Blastomyces positive, Histoplasma, Coccidiomyces negative  HIV: NR   Urine histoplasma antigen - 0.5  Blood histoplasma - 0.83  IgG 1630  IgA 644  IgM 116    T cell subsets:   Component Value Flag Ref Range Units Status Collected Lab   IFC Specimen Blood     Final 11/09/2018  4:21 PM 51   CD3 Mature T 82   49 - 84 % Final 11/09/2018  4:21 PM 51   CD4 [place] T 40   28 - 63 % Final 11/09/2018  4:21 PM 51   CD8 Suppressor T 36   10 - 40 % Final 11/09/2018  4:21 PM 51   CD4:CD8 Ratio 1.11 Abnormally low   L  1.40 - 2.60  Final 11/09/2018  4:21 PM 51   Absolute CD3 1,673   603 - 2,990 cells/uL Final 11/09/2018  4:21 PM 51   Absolute CD4 815   441 - 2,156 cells/uL Final 11/09/2018  4:21 PM 51   Absolute CD8 740   125 - 1,312 cells/uL Final 11/09/2018  4:21 PM 5     Blood histoplasma antigen: positive but less than quantifiable 1/11/19  Blood blastomyces antigen: positive but less than quantifable 1/11/19    Imaging:  Results for orders placed or performed in visit on 09/28/18   MR Pelvis Musclular Tissue wo & w Contrast    Narrative    EXAMINATION: MR PELVIS MUSCULAR TISSUE WO & W CONTRAST,  9/28/2018 7:29 PM     COMPARISON: CT abdomen/pelvis without contrast 5/1/2016    TECHNIQUE:  Images were acquired without and with intravenous contrast  through the pelvis. The following MR images were acquired without  contrast: multiplanar T1, multiplanar T2, axial diffusion-weighted and  axial apparent diffusion coefficient. T1-weighted images with fat  saturation were acquired at the following intervals relative to  intravenous contrast administration: pre-contrast, immediate post  contrast, 1 minute, 3 minutes and  delayed.  Contrast dose: 7.5mL  Gadavist, 0.5 glucogon    HISTORY: ; Rectal prolapse; Rectal ulcer; Anal pain    FINDINGS:    LOCATION/SIZE: In the right posterolateral anus extending from the  internal sphincter through the external sphincter to the anal verge,  there is a 4.3 cm mass with intermediate T2 signal intensity,  relatively homogeneous enhancement, and restricted diffusion (series 6  images 42-47).    EXTENT: The tumor involves the internal and external sphincters. No  involvement of the levator ani musculature.  Inferiorly, the mass  involves the right medial intergluteal cleft.    LYMPH NODES: No overtly suspicious pelvic lymphadenopathy. There are  scattered small pelvic lymph nodes, for example a 7 mm right iliac  chain lymph node (series 6 image 16) and a 3 mm lymph node in the  right posterior mesial rectal fat (series 6 image 23).     MISCELLANEOUS FINDINGS:  Multiple fluid-filled bladder diverticula. There is sigmoid  diverticulosis. Changes of aortobifemoral graft placement with patent  graft and occluded native iliac vessels.  In the posterior  subcutaneous tissues, in the coccyx there is a 1.2 x 0.7 cm ovoid  enhancing nodule (series 15 image 49). This has decreased T2 signal  suggesting prior infectious or traumatic etiology without findings to  suggest malignancy.         Impression    IMPRESSION:  4.3 cm mass in the right posterolateral anus extending  from the internal sphincter through the external sphincter to the  right gluteal fold. The mass also approaches the puborectalis  posteriorly in the midline. Findings are concerning for malignancy.    I have personally reviewed the examination and initial interpretation  and I agree with the findings.    CLARENCE HERNANDEZ MD     CXR 11/9:    IMPRESSION:   1.  Hyperattenuating perihilar nodularity, may represent calcified  granulomas.  2.  No acute airspace disease.    Pathology 10/9/18  FINAL DIAGNOSIS:   A. PERIANAL SKIN, RIGHT LATERAL,  BIOPSY:   - Ulcerated perianal skin with adjacent pseudoepitheliomatous hyperplasia,    granuloma formation, marked chronic   inflammation   - Fungal organisms morphologically compatible with histoplasmosis, see   comment   - Negative for malignancy     B. RIGHT ANAL MASS, BIOPSY:   - Scant fibroadipose tissue   - Negative for malignancy     COMMENT:   Preliminary diagnosis given to Dr. Radu Asher by Dr. Virginia Trevino    on 10/11/18 at 13:49.  GMS stain   confirms the presence of fungal organisms (yeast forms), PAS stain   highlights the organism as well.  Erin stain is negative for AFB.       Again, thank you for allowing me to participate in the care of your patient.      Sincerely,    Carlos Vega MD

## 2019-11-17 NOTE — PROGRESS NOTES
Infectious Disease Clinic Staff Note: Mr. Neal was seen, examined, and the case was discussed with Dr. Dinh, ID Fellow -- I agree with her interval history and examination, assessment and plan in this outpatient ID Progress note. This note reflects my observations and opinions and the plan outlined fully reflects my approach. I have reviewed the available history, radiology, laboratory results, and reports with the Fellow.

## (undated) DEVICE — GLOVE PROTEXIS MICRO 7.5  2D73PM75

## (undated) DEVICE — GLOVE PROTEXIS BLUE W/NEU-THERA 8.0  2D73EB80

## (undated) DEVICE — DRSG GAUZE 4X4" TRAY 6939

## (undated) DEVICE — GOWN IMPERVIOUS BREATHABLE SMART XLG 89045

## (undated) DEVICE — WIPE PREMOIST CLEANSING WASHCLOTHS 7988

## (undated) DEVICE — NDL BX TRU CUT 14GA 4.5" 2N2702X

## (undated) DEVICE — PAD CHUX UNDERPAD 23X24" 7136

## (undated) DEVICE — DRSG TELFA 3X8" 1238

## (undated) DEVICE — KIT ENDO FIRST STEP DISINFECTANT 200ML W/POUCH EP-4

## (undated) DEVICE — PANTIES MESH

## (undated) DEVICE — TUBING SUCTION 10'X3/16" N510

## (undated) RX ORDER — ONDANSETRON 2 MG/ML
INJECTION INTRAMUSCULAR; INTRAVENOUS
Status: DISPENSED
Start: 2018-10-09

## (undated) RX ORDER — PROPOFOL 10 MG/ML
INJECTION, EMULSION INTRAVENOUS
Status: DISPENSED
Start: 2018-10-09

## (undated) RX ORDER — FENTANYL CITRATE 50 UG/ML
INJECTION, SOLUTION INTRAMUSCULAR; INTRAVENOUS
Status: DISPENSED
Start: 2018-10-09

## (undated) RX ORDER — LIDOCAINE HYDROCHLORIDE 20 MG/ML
INJECTION, SOLUTION EPIDURAL; INFILTRATION; INTRACAUDAL; PERINEURAL
Status: DISPENSED
Start: 2018-10-09

## (undated) RX ORDER — CIPROFLOXACIN 2 MG/ML
INJECTION, SOLUTION INTRAVENOUS
Status: DISPENSED
Start: 2018-10-09

## (undated) RX ORDER — ACETAMINOPHEN 325 MG/1
TABLET ORAL
Status: DISPENSED
Start: 2018-10-09

## (undated) RX ORDER — BACITRACIN 500 [USP'U]/G
OINTMENT OPHTHALMIC
Status: DISPENSED
Start: 2018-10-09

## (undated) RX ORDER — EPHEDRINE SULFATE 50 MG/ML
INJECTION, SOLUTION INTRAMUSCULAR; INTRAVENOUS; SUBCUTANEOUS
Status: DISPENSED
Start: 2018-10-09

## (undated) RX ORDER — PHENYLEPHRINE HCL IN 0.9% NACL 1 MG/10 ML
SYRINGE (ML) INTRAVENOUS
Status: DISPENSED
Start: 2018-10-09